# Patient Record
Sex: FEMALE | Race: WHITE | NOT HISPANIC OR LATINO | Employment: UNEMPLOYED | RURAL
[De-identification: names, ages, dates, MRNs, and addresses within clinical notes are randomized per-mention and may not be internally consistent; named-entity substitution may affect disease eponyms.]

---

## 2016-01-19 LAB — PAP RECOMMENDATION EXT: NORMAL

## 2017-03-17 LAB — CRC RECOMMENDATION EXT: NORMAL

## 2022-01-26 ENCOUNTER — OFFICE VISIT (OUTPATIENT)
Dept: FAMILY MEDICINE | Facility: CLINIC | Age: 37
End: 2022-01-26
Payer: OTHER GOVERNMENT

## 2022-01-26 VITALS
OXYGEN SATURATION: 98 % | WEIGHT: 131 LBS | RESPIRATION RATE: 16 BRPM | BODY MASS INDEX: 18.75 KG/M2 | SYSTOLIC BLOOD PRESSURE: 131 MMHG | HEIGHT: 70 IN | TEMPERATURE: 99 F | HEART RATE: 72 BPM | DIASTOLIC BLOOD PRESSURE: 87 MMHG

## 2022-01-26 DIAGNOSIS — B34.9 VIRAL ILLNESS: ICD-10-CM

## 2022-01-26 DIAGNOSIS — Z11.52 ENCOUNTER FOR SCREENING FOR COVID-19: Primary | ICD-10-CM

## 2022-01-26 LAB
CTP QC/QA: YES
FLUAV AG NPH QL: NEGATIVE
FLUBV AG NPH QL: NEGATIVE
SARS-COV-2 AG RESP QL IA.RAPID: NEGATIVE

## 2022-01-26 PROCEDURE — 87428 SARSCOV & INF VIR A&B AG IA: CPT | Mod: QW,,, | Performed by: FAMILY MEDICINE

## 2022-01-26 PROCEDURE — 87428 POCT SARS-COV2 (COVID) WITH FLU ANTIGEN: ICD-10-PCS | Mod: QW,,, | Performed by: FAMILY MEDICINE

## 2022-01-26 PROCEDURE — 99203 PR OFFICE/OUTPT VISIT, NEW, LEVL III, 30-44 MIN: ICD-10-PCS | Mod: ,,, | Performed by: FAMILY MEDICINE

## 2022-01-26 PROCEDURE — 99203 OFFICE O/P NEW LOW 30 MIN: CPT | Mod: ,,, | Performed by: FAMILY MEDICINE

## 2022-01-26 RX ORDER — PROMETHAZINE HYDROCHLORIDE 25 MG/1
TABLET ORAL
COMMUNITY
Start: 2021-11-22

## 2022-01-26 RX ORDER — RIZATRIPTAN BENZOATE 10 MG/1
TABLET, ORALLY DISINTEGRATING ORAL
COMMUNITY
Start: 2021-11-23 | End: 2023-05-02

## 2022-01-26 RX ORDER — NARATRIPTAN 2.5 MG/1
2.5 TABLET ORAL
COMMUNITY
Start: 2021-11-22

## 2022-01-26 RX ORDER — CHOLECALCIFEROL (VITAMIN D3) 125 MCG
CAPSULE ORAL DAILY
COMMUNITY

## 2022-01-26 RX ORDER — TOPIRAMATE 50 MG/1
TABLET, FILM COATED ORAL
COMMUNITY
Start: 2021-11-22 | End: 2024-01-04 | Stop reason: SDUPTHER

## 2022-01-26 RX ORDER — BUPROPION HYDROCHLORIDE 300 MG/1
300 TABLET ORAL DAILY
COMMUNITY
Start: 2021-09-22 | End: 2024-01-04 | Stop reason: SDUPTHER

## 2022-01-26 RX ORDER — NICOTINE POLACRILEX 2 MG
1 GUM BUCCAL DAILY
COMMUNITY

## 2022-01-26 NOTE — PROGRESS NOTES
Subjective:       Patient ID: Kimberly Knutson is a 36 y.o. female.    Chief Complaint: Nasal Congestion, Otalgia, and Sore Throat (X 1 day)    Some right ear discomfort.  No fever some fatigue mild hacky cough    Review of Systems      Objective:      Physical Exam  Constitutional:       Appearance: She is ill-appearing (Mildly). She is not toxic-appearing.   HENT:      Right Ear: Tympanic membrane normal.      Left Ear: Tympanic membrane normal.      Nose: Congestion and rhinorrhea present.   Cardiovascular:      Rate and Rhythm: Normal rate and regular rhythm.   Pulmonary:      Effort: Pulmonary effort is normal.      Breath sounds: Normal breath sounds.   Neurological:      Mental Status: She is alert.         Assessment:       Problem List Items Addressed This Visit    None     Visit Diagnoses     Encounter for screening for COVID-19    -  Primary    Relevant Orders    POCT SARS-COV2 (COVID) with Flu Antigen (Completed)    Viral illness              Plan:         patient likely has COVID despite a negative test.  Recommend quarantine for at least 5 days

## 2023-02-22 LAB — PAP RECOMMENDATION EXT: NORMAL

## 2023-05-02 ENCOUNTER — OFFICE VISIT (OUTPATIENT)
Dept: FAMILY MEDICINE | Facility: CLINIC | Age: 38
End: 2023-05-02
Payer: OTHER GOVERNMENT

## 2023-05-02 ENCOUNTER — PATIENT MESSAGE (OUTPATIENT)
Dept: FAMILY MEDICINE | Facility: CLINIC | Age: 38
End: 2023-05-02
Payer: OTHER GOVERNMENT

## 2023-05-02 VITALS
HEIGHT: 70 IN | BODY MASS INDEX: 20.62 KG/M2 | TEMPERATURE: 98 F | HEART RATE: 69 BPM | SYSTOLIC BLOOD PRESSURE: 122 MMHG | DIASTOLIC BLOOD PRESSURE: 85 MMHG | WEIGHT: 144 LBS | RESPIRATION RATE: 20 BRPM | OXYGEN SATURATION: 100 %

## 2023-05-02 DIAGNOSIS — H92.09 OTALGIA, UNSPECIFIED LATERALITY: Primary | ICD-10-CM

## 2023-05-02 DIAGNOSIS — Z76.89 ENCOUNTER TO ESTABLISH CARE: ICD-10-CM

## 2023-05-02 PROBLEM — G43.709 CHRONIC MIGRAINE WITHOUT AURA WITHOUT STATUS MIGRAINOSUS, NOT INTRACTABLE: Status: ACTIVE | Noted: 2019-10-03

## 2023-05-02 PROCEDURE — 99212 OFFICE O/P EST SF 10 MIN: CPT | Mod: ,,, | Performed by: NURSE PRACTITIONER

## 2023-05-02 PROCEDURE — 99212 PR OFFICE/OUTPT VISIT, EST, LEVL II, 10-19 MIN: ICD-10-PCS | Mod: ,,, | Performed by: NURSE PRACTITIONER

## 2023-05-02 RX ORDER — VITAMIN E MIXED 400 UNIT
400 CAPSULE ORAL DAILY
COMMUNITY

## 2023-05-02 NOTE — PROGRESS NOTES
Deirdre Long, LIZBET   [facility]      PATIENT NAME: Kimberly Knutson  : 1985  DATE: 23  MRN: [id number]      Patient PCP Information       Provider PCP Type    Primary Doctor No General            Reason for Visit / Chief Complaint: Establish Care (Patient presents to clinic to establish primary care.) and Otalgia (Both ear pain but mainly right.)         History of Present Illness / Problem Focused Workflow     Kimberly Knutson presents to the clinic with Establish Care (Patient presents to clinic to establish primary care.) and Otalgia (Both ear pain but mainly right.)     HPI  Ms Knutson presents to clinic to establish care. Also reports right ear pain. Radiating down side of face.   Denies fever. Denies hx of TMJ.  No URI symptoms. No increased sinus congestion.     Prior medical hx and current medications reviewed.   Prior hx of anxiety and migraines.   [facility]- prior hx of abnormal pap smear and colposcopy in past / records requested    Review of Systems     Review of Systems   Constitutional:  Negative for activity change, appetite change, chills, diaphoresis, fatigue, fever and unexpected weight change.   HENT:  Positive for ear pain. Negative for congestion, facial swelling, hearing loss, nosebleeds and sore throat.    Eyes: Negative.    Respiratory:  Negative for apnea, cough, shortness of breath and wheezing.    Cardiovascular:  Negative for chest pain, palpitations and leg swelling.   Gastrointestinal:  Negative for abdominal distention, abdominal pain, blood in stool, constipation, diarrhea and nausea.   Endocrine: Negative for cold intolerance, heat intolerance, polydipsia, polyphagia and polyuria.   Genitourinary:  Negative for decreased urine volume, difficulty urinating, dysuria, flank pain, frequency, hematuria and urgency.   Musculoskeletal:  Negative for arthralgias, joint swelling and myalgias.   Skin:  Negative for color change and rash.   Allergic/Immunologic: Negative.     Neurological:  Negative for dizziness, tremors, seizures, syncope, facial asymmetry, speech difficulty, weakness, light-headedness, numbness and headaches.   Hematological:  Negative for adenopathy. Does not bruise/bleed easily.   Psychiatric/Behavioral:  Negative for behavioral problems and confusion.      Medical / Social / Family History     Past Medical History:   Diagnosis Date    Generalized anxiety disorder        Past Surgical History:   Procedure Laterality Date    barium enema      CERVICAL BIOPSY  W/ LOOP ELECTRODE EXCISION       SECTION      2 c sections    COLONOSCOPY      COLPOSCOPY      GALLBLADDER SURGERY      hemorroidectomy      mommy makeover      Tummy landon davis lipo, breast augmentation with lift.    pylonidal cyst      REPAIR OF EPIGASTRIC HERNIA      TONSILLECTOMY      wisdom teeth removal      All wisdom teeth       Social History  Ms.  reports that she has never smoked. She has never been exposed to tobacco smoke. She has never used smokeless tobacco. She reports that she does not currently use alcohol after a past usage of about 5.0 standard drinks per week. She reports that she does not use drugs.    Family History  Ms.'s family history includes Arthritis in her maternal grandmother and mother; Diabetes in her paternal grandmother; Heart disease in her paternal grandfather; Hypertension in her maternal grandmother; Lung cancer in her maternal grandmother; No Known Problems in her father.    Medications and Allergies     Medications  Outpatient Medications Marked as Taking for the 23 encounter (Office Visit) with LIZBET Rivera   Medication Sig Dispense Refill    biotin 1 mg Cap biotin   76588 daily      buPROPion (WELLBUTRIN XL) 300 MG 24 hr tablet once daily.      cholecalciferol, vitamin D3, 125 mcg (5,000 unit) capsule Take by mouth once daily.      psyllium husk, with sugar, 3.4 gram/7 gram Powd Take 1 packet by mouth. metamucil      topiramate (TOPAMAX) 50  "MG tablet TAKE 1 TABLET BY MOUTH TWICE DAILY FOR HEADACHE PREVENTION         Allergies  Review of patient's allergies indicates:   Allergen Reactions    Xylocaine with epinephrine [lidocaine-epinephrine] Swelling     Swelling of tongue       Physical Examination     Vitals:    05/02/23 1505   BP: 122/85   BP Location: Right arm   Patient Position: Sitting   BP Method: Medium (Automatic)   Pulse: 69   Resp: 20   Temp: 97.7 °F (36.5 °C)   TempSrc: Oral   SpO2: 100%   Weight: 65.3 kg (144 lb)   Height: 5' 10" (1.778 m)       Physical Exam  Vitals and nursing note reviewed.   Constitutional:       Appearance: Normal appearance. She is normal weight.   HENT:      Head: Normocephalic.      Right Ear: Tympanic membrane, ear canal and external ear normal.      Left Ear: Tympanic membrane, ear canal and external ear normal.      Ears:      Comments: No TMJ crepitus on exam     Nose: Nose normal.      Mouth/Throat:      Mouth: Mucous membranes are moist.   Eyes:      Extraocular Movements: Extraocular movements intact.      Conjunctiva/sclera: Conjunctivae normal.      Pupils: Pupils are equal, round, and reactive to light.   Cardiovascular:      Rate and Rhythm: Normal rate and regular rhythm.      Pulses: Normal pulses.      Heart sounds: Normal heart sounds. No murmur heard.  Pulmonary:      Effort: Pulmonary effort is normal.      Breath sounds: Normal breath sounds. No stridor. No wheezing or rhonchi.   Abdominal:      General: Bowel sounds are normal. There is no distension.      Palpations: Abdomen is soft. There is no mass.      Tenderness: There is no abdominal tenderness.   Musculoskeletal:         General: No swelling or tenderness. Normal range of motion.      Cervical back: Normal range of motion and neck supple.      Right lower leg: No edema.      Left lower leg: No edema.   Skin:     General: Skin is warm and dry.      Capillary Refill: Capillary refill takes less than 2 seconds.   Neurological:      General: " No focal deficit present.      Mental Status: She is alert and oriented to person, place, and time. Mental status is at baseline.      Cranial Nerves: No cranial nerve deficit.      Sensory: No sensory deficit.      Motor: No weakness.   Psychiatric:         Mood and Affect: Mood normal.         Behavior: Behavior normal.         Thought Content: Thought content normal.         Judgment: Judgment normal.         No visits with results within 14 Day(s) from this visit.   Latest known visit with results is:   Office Visit on 01/26/2022   Component Date Value Ref Range Status    SARS Coronavirus 2 Antigen 01/26/2022 Negative  Negative Final    Rapid Influenza A Ag 01/26/2022 Negative  Negative Final    Rapid Influenza B Ag 01/26/2022 Negative  Negative Final     Acceptable 01/26/2022 Yes   Final             Assessment and Plan (including Health Maintenance)         Plan:   Otalgia, unspecified laterality    Encounter to establish care     OTC NSAID prn for ear pain, RTC if pain persist  RTC for routine wellness   There are no Patient Instructions on file for this visit.       Health Maintenance Due   Topic Date Due    Hepatitis C Screening  Never done    Cervical Cancer Screening  Never done    Lipid Panel  Never done    HIV Screening  Never done    COVID-19 Vaccine (3 - Booster for Pfizer series) 03/04/2021       Most Recent Immunizations   Administered Date(s) Administered    COVID-19, MRNA, LN-S, PF (Pfizer) (Purple Cap) 01/07/2021    Hepatitis A, Adult 08/14/2020    Influenza 09/18/2020    Tdap 09/23/2014        Problem List Items Addressed This Visit    None  Visit Diagnoses       Otalgia, unspecified laterality    -  Primary    Encounter to establish care                Health Maintenance Topics with due status: Not Due       Topic Last Completion Date    TETANUS VACCINE 09/23/2014    Influenza Vaccine 09/18/2020       Future Appointments   Date Time Provider Department Center   6/8/2023  8:30 AM  LIZBET Rivera [facility] [place]            Signature:  LIZBET Rivera  [facility]     Date of encounter: 5/2/23

## 2023-05-03 ENCOUNTER — PATIENT OUTREACH (OUTPATIENT)
Dept: ADMINISTRATIVE | Facility: HOSPITAL | Age: 38
End: 2023-05-03

## 2023-05-03 NOTE — LETTER
AUTHORIZATION FOR RELEASE OF   CONFIDENTIAL INFORMATION    Dear [facility],    We are seeing Kimberly Knutson, date of birth 1985, in the clinic at [facility]. LIZBET Rivera is the patient's PCP. Kimberly Knutson has an outstanding lab/procedure at the time we reviewed her chart. In order to help keep her health information updated, she has authorized us to request the following medical record(s):        (  )  MAMMOGRAM                                      ( x )  COLONOSCOPY      (  )  PAP SMEAR                                          (  )  OUTSIDE LAB RESULTS     (  )  DEXA SCAN                                          (  )  EYE EXAM            (  )  FOOT EXAM                                          (  )  ENTIRE RECORD     (  )  OUTSIDE IMMUNIZATIONS                 (  )  _______________         Please fax records to [facility], Melissa P Rogers, FNP, [phone]     If you have any questions, please contact Blake Mendez at .           Patient Name: Kimberly Knutson  : 1985  Patient Phone #: [phone]

## 2023-05-04 ENCOUNTER — PATIENT OUTREACH (OUTPATIENT)
Dept: ADMINISTRATIVE | Facility: HOSPITAL | Age: 38
End: 2023-05-04

## 2023-05-04 NOTE — Clinical Note
Just wanted you to be aware that there were no records found for this patient at [facility].

## 2023-05-08 ENCOUNTER — PATIENT OUTREACH (OUTPATIENT)
Dept: ADMINISTRATIVE | Facility: HOSPITAL | Age: 38
End: 2023-05-08

## 2023-05-10 ENCOUNTER — PATIENT MESSAGE (OUTPATIENT)
Dept: FAMILY MEDICINE | Facility: CLINIC | Age: 38
End: 2023-05-10
Payer: OTHER GOVERNMENT

## 2023-05-25 ENCOUNTER — PATIENT MESSAGE (OUTPATIENT)
Dept: FAMILY MEDICINE | Facility: CLINIC | Age: 38
End: 2023-05-25
Payer: OTHER GOVERNMENT

## 2023-05-26 ENCOUNTER — OFFICE VISIT (OUTPATIENT)
Dept: FAMILY MEDICINE | Facility: CLINIC | Age: 38
End: 2023-05-26
Payer: OTHER GOVERNMENT

## 2023-05-26 VITALS
HEIGHT: 70 IN | OXYGEN SATURATION: 99 % | HEART RATE: 73 BPM | BODY MASS INDEX: 20.33 KG/M2 | WEIGHT: 142 LBS | RESPIRATION RATE: 20 BRPM | SYSTOLIC BLOOD PRESSURE: 118 MMHG | DIASTOLIC BLOOD PRESSURE: 80 MMHG | TEMPERATURE: 98 F

## 2023-05-26 DIAGNOSIS — N30.01 ACUTE CYSTITIS WITH HEMATURIA: ICD-10-CM

## 2023-05-26 DIAGNOSIS — R39.9 UTI SYMPTOMS: Primary | ICD-10-CM

## 2023-05-26 LAB
BILIRUB SERPL-MCNC: NEGATIVE MG/DL
BLOOD URINE, POC: ABNORMAL
COLOR, POC UA: YELLOW
GLUCOSE UR QL STRIP: NEGATIVE
KETONES UR QL STRIP: NEGATIVE
LEUKOCYTE ESTERASE URINE, POC: ABNORMAL
NITRITE, POC UA: NEGATIVE
PH, POC UA: 5.5
PROTEIN, POC: NEGATIVE
SPECIFIC GRAVITY, POC UA: 1.01
UROBILINOGEN, POC UA: 0.2

## 2023-05-26 PROCEDURE — 87086 URINE CULTURE/COLONY COUNT: CPT | Mod: ,,, | Performed by: CLINICAL MEDICAL LABORATORY

## 2023-05-26 PROCEDURE — 87186 SC STD MICRODIL/AGAR DIL: CPT | Mod: ,,, | Performed by: CLINICAL MEDICAL LABORATORY

## 2023-05-26 PROCEDURE — 99213 PR OFFICE/OUTPT VISIT, EST, LEVL III, 20-29 MIN: ICD-10-PCS | Mod: ,,, | Performed by: NURSE PRACTITIONER

## 2023-05-26 PROCEDURE — 87077 CULTURE, URINE: ICD-10-PCS | Mod: ,,, | Performed by: CLINICAL MEDICAL LABORATORY

## 2023-05-26 PROCEDURE — 87086 CULTURE, URINE: ICD-10-PCS | Mod: ,,, | Performed by: CLINICAL MEDICAL LABORATORY

## 2023-05-26 PROCEDURE — 99213 OFFICE O/P EST LOW 20 MIN: CPT | Mod: ,,, | Performed by: NURSE PRACTITIONER

## 2023-05-26 PROCEDURE — 81003 URINALYSIS AUTO W/O SCOPE: CPT | Mod: QW,,, | Performed by: NURSE PRACTITIONER

## 2023-05-26 PROCEDURE — 87077 CULTURE AEROBIC IDENTIFY: CPT | Mod: ,,, | Performed by: CLINICAL MEDICAL LABORATORY

## 2023-05-26 PROCEDURE — 87186 CULTURE, URINE: ICD-10-PCS | Mod: ,,, | Performed by: CLINICAL MEDICAL LABORATORY

## 2023-05-26 PROCEDURE — 81003 POCT URINALYSIS W/O SCOPE: ICD-10-PCS | Mod: QW,,, | Performed by: NURSE PRACTITIONER

## 2023-05-26 RX ORDER — NITROFURANTOIN 25; 75 MG/1; MG/1
100 CAPSULE ORAL 2 TIMES DAILY
Qty: 20 CAPSULE | Refills: 0 | Status: SHIPPED | OUTPATIENT
Start: 2023-05-26 | End: 2023-06-05

## 2023-05-26 NOTE — PROGRESS NOTES
LIZBET Rivera   [facility]      PATIENT NAME: Kimberly Knutson  : 1985  DATE: 23  MRN: [id number]      Patient PCP Information       Provider PCP Type    Deirdre P LIZBET Long General            Reason for Visit / Chief Complaint: Urinary Tract Infection (Patient presents to the clinic complaints of uti/Rm 3)           History of Present Illness / Problem Focused Workflow     Kimberly Knutson presents to the clinic with Urinary Tract Infection (Patient presents to the clinic complaints of uti/Rm 3)     HPI  Ms Knutson presents to clinic with concern for UTI. Lower abd pain, dysuria and frequency.   Denies nvd.     Review of Systems     Review of Systems   Constitutional:  Negative for activity change, appetite change, chills, diaphoresis, fatigue, fever and unexpected weight change.   HENT:  Negative for congestion, ear pain, facial swelling, hearing loss, nosebleeds and sore throat.    Respiratory:  Negative for apnea, cough, shortness of breath and wheezing.    Cardiovascular:  Negative for chest pain, palpitations and leg swelling.   Gastrointestinal:  Positive for abdominal distention. Negative for abdominal pain, blood in stool, constipation, diarrhea and nausea.   Endocrine: Negative for cold intolerance, heat intolerance, polydipsia, polyphagia and polyuria.   Genitourinary:  Positive for dysuria, frequency and hematuria. Negative for decreased urine volume, difficulty urinating, flank pain and urgency.   Musculoskeletal:  Negative for arthralgias, joint swelling and myalgias.   Skin:  Negative for color change and rash.   Neurological:  Negative for dizziness, tremors, seizures, syncope, facial asymmetry, speech difficulty, weakness, light-headedness, numbness and headaches.   Hematological:  Negative for adenopathy. Does not bruise/bleed easily.   Psychiatric/Behavioral:  Negative for behavioral problems and confusion.      Medical / Social / Family History     Past Medical History:    Diagnosis Date    Generalized anxiety disorder        Past Surgical History:   Procedure Laterality Date    barium enema      CERVICAL BIOPSY  W/ LOOP ELECTRODE EXCISION       SECTION      2 c sections    COLONOSCOPY      COLPOSCOPY      GALLBLADDER SURGERY      hemorroidectomy      mommy makeover      Tummy tuck, lovehandle lipo, breast augmentation with lift.    pylonidal cyst      REPAIR OF EPIGASTRIC HERNIA      TONSILLECTOMY      wisdom teeth removal      All wisdom teeth       Social History  Ms.  reports that she has never smoked. She has never been exposed to tobacco smoke. She has never used smokeless tobacco. She reports that she does not currently use alcohol after a past usage of about 5.0 standard drinks per week. She reports that she does not use drugs.    Family History  Ms.'s family history includes Arthritis in her maternal grandmother and mother; Diabetes in her paternal grandmother; Heart disease in her paternal grandfather; Hypertension in her maternal grandmother; Lung cancer in her maternal grandmother; No Known Problems in her father.    Medications and Allergies     Medications  Outpatient Medications Marked as Taking for the 23 encounter (Office Visit) with LIZBET Rivera   Medication Sig Dispense Refill    biotin 1 mg Cap biotin   60686 daily      buPROPion (WELLBUTRIN XL) 300 MG 24 hr tablet once daily.      cholecalciferol, vitamin D3, 125 mcg (5,000 unit) capsule Take by mouth once daily.      naratriptan (AMERGE) 2.5 MG tablet as needed.      promethazine (PHENERGAN) 25 MG tablet TAKE 1/2 TO 1 TABLET BY MOUTH EVERY 6 HOURS AS NEEDED FOR NAUSEA OR HEADACHE      psyllium husk, with sugar, 3.4 gram/7 gram Powd Take 1 packet by mouth. metamucil      topiramate (TOPAMAX) 50 MG tablet TAKE 1 TABLET BY MOUTH TWICE DAILY FOR HEADACHE PREVENTION      vitamin E, dl,tocopheryl acet, (VITAMIN E, DL, ACETATE,) 180 mg (400 unit) Cap          Allergies  Review of patient's  "allergies indicates:   Allergen Reactions    Xylocaine with epinephrine [lidocaine-epinephrine] Swelling     Swelling of tongue       Physical Examination     Vitals:    05/26/23 0957   BP: 118/80   BP Location: Right arm   Patient Position: Sitting   BP Method: Small (Automatic)   Pulse: 73   Resp: 20   Temp: 97.7 °F (36.5 °C)   TempSrc: Oral   SpO2: 99%   Weight: 64.4 kg (142 lb)   Height: 5' 10" (1.778 m)     Over the last two weeks how often have you been bothered by little interest or pleasure in doing things: 0  Over the last two weeks how often have you been bothered by feeling down, depressed or hopeless: 0  PHQ-2 Total Score: 0  PHQ-9 Score: 2  PHQ-9 Interpretation: Minimal or None      Physical Exam  Vitals and nursing note reviewed.   Constitutional:       Appearance: Normal appearance. She is normal weight.   HENT:      Head: Normocephalic.      Right Ear: External ear normal.      Left Ear: External ear normal.      Nose: Nose normal.      Mouth/Throat:      Mouth: Mucous membranes are moist.   Eyes:      Extraocular Movements: Extraocular movements intact.      Conjunctiva/sclera: Conjunctivae normal.      Pupils: Pupils are equal, round, and reactive to light.   Cardiovascular:      Rate and Rhythm: Normal rate and regular rhythm.      Pulses: Normal pulses.      Heart sounds: Normal heart sounds. No murmur heard.  Pulmonary:      Effort: Pulmonary effort is normal.      Breath sounds: Normal breath sounds. No stridor. No wheezing or rhonchi.   Abdominal:      General: Bowel sounds are normal. There is no distension.      Palpations: Abdomen is soft. There is no mass.      Tenderness: There is abdominal tenderness.      Comments: generalized   Musculoskeletal:         General: No swelling or tenderness. Normal range of motion.      Cervical back: Normal range of motion and neck supple.      Right lower leg: No edema.      Left lower leg: No edema.   Skin:     General: Skin is warm and dry.      " Capillary Refill: Capillary refill takes less than 2 seconds.   Neurological:      General: No focal deficit present.      Mental Status: She is alert and oriented to person, place, and time. Mental status is at baseline.      Cranial Nerves: No cranial nerve deficit.      Sensory: No sensory deficit.      Motor: No weakness.   Psychiatric:         Mood and Affect: Mood normal.         Behavior: Behavior normal.         Thought Content: Thought content normal.         Judgment: Judgment normal.         Office Visit on 05/26/2023   Component Date Value Ref Range Status    Color, UA 05/26/2023 Yellow   Final    Spec Grav UA 05/26/2023 1.010   Final    pH, UA 05/26/2023 5.5   Final    WBC, UA 05/26/2023 small   Final    Nitrite, UA 05/26/2023 negative   Final    Protein, POC 05/26/2023 negative   Final    Glucose, UA 05/26/2023 negative   Final    Ketones, UA 05/26/2023 negative   Final    Bilirubin, POC 05/26/2023 negative   Final    Urobilinogen, UA 05/26/2023 0.2   Final    Blood, UA 05/26/2023 moderate   Final             Assessment and Plan (including Health Maintenance)       Plan:   UTI symptoms  -     POCT URINALYSIS W/O SCOPE  -     Urine culture; Future; Expected date: 05/26/2023    Acute cystitis with hematuria  -     nitrofurantoin, macrocrystal-monohydrate, (MACROBID) 100 MG capsule; Take 1 capsule (100 mg total) by mouth 2 (two) times daily. for 10 days  Dispense: 20 capsule; Refill: 0    Increase oral hydration RTC prn   There are no Patient Instructions on file for this visit.       Health Maintenance Due   Topic Date Due    Hepatitis C Screening  Never done    Lipid Panel  Never done    HIV Screening  Never done    COVID-19 Vaccine (3 - Pfizer series) 03/04/2021       Most Recent Immunizations   Administered Date(s) Administered    COVID-19, MRNA, LN-S, PF (Pfizer) (Purple Cap) 01/07/2021    Hepatitis A, Adult 08/14/2020    Influenza 09/18/2020    Tdap 09/23/2014        Problem List Items Addressed  This Visit    None  Visit Diagnoses       UTI symptoms    -  Primary    Relevant Orders    POCT URINALYSIS W/O SCOPE (Completed)    Urine culture    Acute cystitis with hematuria        Relevant Medications    nitrofurantoin, macrocrystal-monohydrate, (MACROBID) 100 MG capsule            Health Maintenance Topics with due status: Not Due       Topic Last Completion Date    TETANUS VACCINE 09/23/2014    Influenza Vaccine 09/18/2020    Cervical Cancer Screening 02/22/2023       Future Appointments   Date Time Provider Department Center   6/8/2023  8:30 AM LIZBET Rivera [facility] [place]            Signature:  LIZBET Rivera  [facility]     Date of encounter: 5/26/23

## 2023-05-26 NOTE — Clinical Note
Can we request records again under her maiden name Kimberly Goldberg (her maiden name) sorry about that. She mentioned today she wasn't  at the time of study. Thanks Deirdre

## 2023-05-28 LAB — UA COMPLETE W REFLEX CULTURE PNL UR: ABNORMAL

## 2023-05-31 ENCOUNTER — PATIENT MESSAGE (OUTPATIENT)
Dept: FAMILY MEDICINE | Facility: CLINIC | Age: 38
End: 2023-05-31
Payer: OTHER GOVERNMENT

## 2023-06-05 ENCOUNTER — PATIENT OUTREACH (OUTPATIENT)
Dept: ADMINISTRATIVE | Facility: HOSPITAL | Age: 38
End: 2023-06-05

## 2023-06-05 NOTE — PROGRESS NOTES
CHRISTIAN faxed to [facility] again in [place] but requested records under maiden name Kimberly Goldberg. [phone] (G).

## 2023-06-05 NOTE — LETTER
AUTHORIZATION FOR RELEASE OF   CONFIDENTIAL INFORMATION    Dear [facility],    We are seeing Kimberly Knutson also known as Kimberly Goldberg, date of birth 1985, in the clinic at [facility]. LIZBET Rivera is the patient's PCP. Kimberly Knutson has an outstanding lab/procedure at the time we reviewed her chart. In order to help keep her health information updated, she has authorized us to request the following medical record(s):        (  )  MAMMOGRAM                                      ( x )  COLONOSCOPY      (  )  PAP SMEAR                                          (  )  OUTSIDE LAB RESULTS     (  )  DEXA SCAN                                          (  )  EYE EXAM            (  )  FOOT EXAM                                          (  )  ENTIRE RECORD     (  )  OUTSIDE IMMUNIZATIONS                 (  )  _______________         Please fax records to [facility], Melissa P Rogers, FNP, [phone].     If you have any questions, please contact Blake Mendez at .           Patient Name: Kimberly Knutson  : 1985  Patient Phone #: [phone]

## 2023-06-06 ENCOUNTER — PATIENT OUTREACH (OUTPATIENT)
Dept: ADMINISTRATIVE | Facility: HOSPITAL | Age: 38
End: 2023-06-06

## 2023-06-13 ENCOUNTER — OFFICE VISIT (OUTPATIENT)
Dept: FAMILY MEDICINE | Facility: CLINIC | Age: 38
End: 2023-06-13
Payer: OTHER GOVERNMENT

## 2023-06-13 VITALS
BODY MASS INDEX: 20.45 KG/M2 | OXYGEN SATURATION: 100 % | DIASTOLIC BLOOD PRESSURE: 81 MMHG | WEIGHT: 142.81 LBS | HEIGHT: 70 IN | RESPIRATION RATE: 20 BRPM | TEMPERATURE: 99 F | HEART RATE: 64 BPM | SYSTOLIC BLOOD PRESSURE: 120 MMHG

## 2023-06-13 DIAGNOSIS — Z13.220 SCREENING FOR LIPID DISORDERS: ICD-10-CM

## 2023-06-13 DIAGNOSIS — Z00.00 ENCOUNTER FOR GENERAL ADULT MEDICAL EXAMINATION WITHOUT ABNORMAL FINDINGS: Primary | ICD-10-CM

## 2023-06-13 DIAGNOSIS — Z11.4 SCREENING FOR HIV (HUMAN IMMUNODEFICIENCY VIRUS): ICD-10-CM

## 2023-06-13 DIAGNOSIS — Z13.1 SCREENING FOR DIABETES MELLITUS: ICD-10-CM

## 2023-06-13 DIAGNOSIS — Z11.59 NEED FOR HEPATITIS C SCREENING TEST: ICD-10-CM

## 2023-06-13 LAB
ALBUMIN SERPL BCP-MCNC: 4.3 G/DL (ref 3.5–5)
ALBUMIN/GLOB SERPL: 1.5 {RATIO}
ALP SERPL-CCNC: 47 U/L (ref 37–98)
ALT SERPL W P-5'-P-CCNC: 19 U/L (ref 13–56)
ANION GAP SERPL CALCULATED.3IONS-SCNC: 9 MMOL/L (ref 7–16)
AST SERPL W P-5'-P-CCNC: 12 U/L (ref 15–37)
BASOPHILS # BLD AUTO: 0.04 K/UL (ref 0–0.2)
BASOPHILS NFR BLD AUTO: 0.6 % (ref 0–1)
BILIRUB SERPL-MCNC: 0.7 MG/DL (ref ?–1.2)
BUN SERPL-MCNC: 15 MG/DL (ref 7–18)
BUN/CREAT SERPL: 16 (ref 6–20)
CALCIUM SERPL-MCNC: 9 MG/DL (ref 8.5–10.1)
CHLORIDE SERPL-SCNC: 109 MMOL/L (ref 98–107)
CHOLEST SERPL-MCNC: 198 MG/DL (ref 0–200)
CHOLEST/HDLC SERPL: 2.9 {RATIO}
CO2 SERPL-SCNC: 24 MMOL/L (ref 21–32)
CREAT SERPL-MCNC: 0.96 MG/DL (ref 0.55–1.02)
DIFFERENTIAL METHOD BLD: ABNORMAL
EGFR (NO RACE VARIABLE) (RUSH/TITUS): 77 ML/MIN/1.73M2
EOSINOPHIL # BLD AUTO: 0.05 K/UL (ref 0–0.5)
EOSINOPHIL NFR BLD AUTO: 0.8 % (ref 1–4)
ERYTHROCYTE [DISTWIDTH] IN BLOOD BY AUTOMATED COUNT: 12.1 % (ref 11.5–14.5)
GLOBULIN SER-MCNC: 2.8 G/DL (ref 2–4)
GLUCOSE SERPL-MCNC: 88 MG/DL (ref 74–106)
HCT VFR BLD AUTO: 42.7 % (ref 38–47)
HCV AB SER QL: NORMAL
HDLC SERPL-MCNC: 68 MG/DL (ref 40–60)
HGB BLD-MCNC: 14.3 G/DL (ref 12–16)
HIV 1+O+2 AB SERPL QL: NORMAL
IMM GRANULOCYTES # BLD AUTO: 0.02 K/UL (ref 0–0.04)
IMM GRANULOCYTES NFR BLD: 0.3 % (ref 0–0.4)
LDLC SERPL CALC-MCNC: 114 MG/DL
LDLC/HDLC SERPL: 1.7 {RATIO}
LYMPHOCYTES # BLD AUTO: 1.99 K/UL (ref 1–4.8)
LYMPHOCYTES NFR BLD AUTO: 32.3 % (ref 27–41)
MCH RBC QN AUTO: 29.3 PG (ref 27–31)
MCHC RBC AUTO-ENTMCNC: 33.5 G/DL (ref 32–36)
MCV RBC AUTO: 87.5 FL (ref 80–96)
MONOCYTES # BLD AUTO: 0.43 K/UL (ref 0–0.8)
MONOCYTES NFR BLD AUTO: 7 % (ref 2–6)
MPC BLD CALC-MCNC: 9.7 FL (ref 9.4–12.4)
NEUTROPHILS # BLD AUTO: 3.64 K/UL (ref 1.8–7.7)
NEUTROPHILS NFR BLD AUTO: 59 % (ref 53–65)
NONHDLC SERPL-MCNC: 130 MG/DL
NRBC # BLD AUTO: 0 X10E3/UL
NRBC, AUTO (.00): 0 %
PLATELET # BLD AUTO: 237 K/UL (ref 150–400)
POTASSIUM SERPL-SCNC: 3.9 MMOL/L (ref 3.5–5.1)
PROT SERPL-MCNC: 7.1 G/DL (ref 6.4–8.2)
RBC # BLD AUTO: 4.88 M/UL (ref 4.2–5.4)
SODIUM SERPL-SCNC: 138 MMOL/L (ref 136–145)
TRIGL SERPL-MCNC: 78 MG/DL (ref 35–150)
VLDLC SERPL-MCNC: 16 MG/DL
WBC # BLD AUTO: 6.17 K/UL (ref 4.5–11)

## 2023-06-13 PROCEDURE — 85025 COMPLETE CBC W/AUTO DIFF WBC: CPT | Mod: ,,, | Performed by: CLINICAL MEDICAL LABORATORY

## 2023-06-13 PROCEDURE — 99395 PREV VISIT EST AGE 18-39: CPT | Mod: ,,, | Performed by: NURSE PRACTITIONER

## 2023-06-13 PROCEDURE — 87389 HIV-1 AG W/HIV-1&-2 AB AG IA: CPT | Mod: ,,, | Performed by: CLINICAL MEDICAL LABORATORY

## 2023-06-13 PROCEDURE — 80061 LIPID PANEL: ICD-10-PCS | Mod: ,,, | Performed by: CLINICAL MEDICAL LABORATORY

## 2023-06-13 PROCEDURE — 86803 HEPATITIS C AB TEST: CPT | Mod: ,,, | Performed by: CLINICAL MEDICAL LABORATORY

## 2023-06-13 PROCEDURE — 87389 HIV 1 / 2 ANTIBODY: ICD-10-PCS | Mod: ,,, | Performed by: CLINICAL MEDICAL LABORATORY

## 2023-06-13 PROCEDURE — 99395 PR PREVENTIVE VISIT,EST,18-39: ICD-10-PCS | Mod: ,,, | Performed by: NURSE PRACTITIONER

## 2023-06-13 PROCEDURE — 80053 COMPREHENSIVE METABOLIC PANEL: ICD-10-PCS | Mod: ,,, | Performed by: CLINICAL MEDICAL LABORATORY

## 2023-06-13 PROCEDURE — 86803 HEPATITIS C ANTIBODY: ICD-10-PCS | Mod: ,,, | Performed by: CLINICAL MEDICAL LABORATORY

## 2023-06-13 PROCEDURE — 85025 CBC WITH DIFFERENTIAL: ICD-10-PCS | Mod: ,,, | Performed by: CLINICAL MEDICAL LABORATORY

## 2023-06-13 PROCEDURE — 80061 LIPID PANEL: CPT | Mod: ,,, | Performed by: CLINICAL MEDICAL LABORATORY

## 2023-06-13 PROCEDURE — 80053 COMPREHEN METABOLIC PANEL: CPT | Mod: ,,, | Performed by: CLINICAL MEDICAL LABORATORY

## 2023-06-13 NOTE — PROGRESS NOTES
" LIZBET Rivera   [facility]      PATIENT NAME: Kimberly Knutson  : 1985  DATE: 23  MRN: [id number]      Patient PCP Information       Provider PCP Type    LIZBET Rivera General          Chief Complaint      Chief Complaint   Patient presents with    wellness     Patient presents to the clinic a wellness ''"  Rm1         History of Present Illness        Kimberly Knutson is a 37 y.o. female who presents for routine wellness visit. Pt states she is doing well with no acute complaints. Pt denies FHx of breast or cervical cancer. Paternal aunt hx of colon cancer. Last colonoscopy was  with routine screening 45 year old recs. Torturous colon in past.  Last mammogram was [id number] mastitis. Last pap was 2023 ASCUS follow up scheduled in Aug with Javan. Had flu shot recommended in 2023. Nonsmoker. Working on improving diet and exercise.    Past Medical History:  Past Medical History:   Diagnosis Date    Generalized anxiety disorder        Past Surgical History:   has a past surgical history that includes Tonsillectomy; Gallbladder surgery; Repair of epigastric hernia; pylonidal cyst; Cervical biopsy w/ loop electrode excision; hemorroidectomy; wisdom teeth removal;  section; Colonoscopy; barium enema; Colposcopy; and mommy makeover.    Social History:  Social History     Tobacco Use    Smoking status: Never     Passive exposure: Never    Smokeless tobacco: Never   Substance Use Topics    Alcohol use: Not Currently     Alcohol/week: 5.0 standard drinks     Types: 2 Glasses of wine, 3 Cans of beer per week     Comment: 5-6 drinks total    Drug use: Never       I personally reviewed all past medical, surgical, and social.     Review of Systems   Constitutional:  Negative for activity change, appetite change, chills, diaphoresis, fatigue, fever and unexpected weight change.   HENT:  Negative for congestion, ear pain, facial swelling, hearing loss, nosebleeds and sore " throat.    Eyes: Negative.    Respiratory:  Negative for apnea, cough, shortness of breath and wheezing.    Cardiovascular:  Negative for chest pain, palpitations and leg swelling.   Gastrointestinal:  Negative for abdominal distention, abdominal pain, blood in stool, constipation, diarrhea and nausea.   Endocrine: Negative for cold intolerance, heat intolerance, polydipsia, polyphagia and polyuria.   Genitourinary:  Negative for decreased urine volume, difficulty urinating, dysuria, flank pain, frequency, hematuria and urgency.   Musculoskeletal:  Negative for arthralgias, joint swelling and myalgias.   Skin:  Negative for color change and rash.   Allergic/Immunologic: Negative.    Neurological:  Negative for dizziness, tremors, seizures, syncope, facial asymmetry, speech difficulty, weakness, light-headedness, numbness and headaches.   Hematological:  Negative for adenopathy. Does not bruise/bleed easily.   Psychiatric/Behavioral:  Negative for behavioral problems and confusion.       Medications:  Outpatient Encounter Medications as of 2023   Medication Sig Dispense Refill    biotin 1 mg Cap biotin   29109 daily      buPROPion (WELLBUTRIN XL) 300 MG 24 hr tablet once daily.      cholecalciferol, vitamin D3, 125 mcg (5,000 unit) capsule Take by mouth once daily.      naratriptan (AMERGE) 2.5 MG tablet as needed.      promethazine (PHENERGAN) 25 MG tablet TAKE 1/2 TO 1 TABLET BY MOUTH EVERY 6 HOURS AS NEEDED FOR NAUSEA OR HEADACHE      psyllium husk, with sugar, 3.4 gram/7 gram Powd Take 1 packet by mouth. metamucil      topiramate (TOPAMAX) 50 MG tablet TAKE 1 TABLET BY MOUTH TWICE DAILY FOR HEADACHE PREVENTION      vitamin E, dl,tocopheryl acet, (VITAMIN E, DL, ACETATE,) 180 mg (400 unit) Cap       [] nitrofurantoin, macrocrystal-monohydrate, (MACROBID) 100 MG capsule Take 1 capsule (100 mg total) by mouth 2 (two) times daily. for 10 days 20 capsule 0     No facility-administered encounter medications  "on file as of 6/13/2023.       Allergies:  Review of patient's allergies indicates:   Allergen Reactions    Xylocaine with epinephrine [lidocaine-epinephrine] Swelling     Swelling of tongue       Health Maintenance:  Immunization History   Administered Date(s) Administered    COVID-19, MRNA, LN-S, PF (Pfizer) (Purple Cap) 12/18/2020, 01/07/2021    Hepatitis A, Adult 01/27/2020, 08/14/2020    Influenza 09/12/2012, 09/23/2014, 09/25/2017, 10/04/2019, 09/18/2020    Tdap 09/23/2014      Health Maintenance   Topic Date Due    TETANUS VACCINE  09/23/2024    Hepatitis C Screening  Completed    Lipid Panel  Completed        Physical Exam      Vital Signs  Temp: 98.5 °F (36.9 °C)  Temp Source: Oral  Pulse: 64  Resp: 20  SpO2: 100 %  BP: 120/81  BP Location: Right arm  Patient Position: Sitting  Height and Weight  Height: 5' 10" (177.8 cm)  Weight: 64.8 kg (142 lb 12.8 oz)  BSA (Calculated - sq m): 1.79 sq meters  BMI (Calculated): 20.5  Weight in (lb) to have BMI = 25: 173.9]  Over the last two weeks how often have you been bothered by little interest or pleasure in doing things: 0  Over the last two weeks how often have you been bothered by feeling down, depressed or hopeless: 0  PHQ-2 Total Score: 0  PHQ-9 Score: 2  PHQ-9 Interpretation: Minimal or None      Physical Exam  Vitals and nursing note reviewed.   Constitutional:       Appearance: Normal appearance. She is normal weight.   HENT:      Head: Normocephalic.      Right Ear: External ear normal.      Left Ear: External ear normal.      Nose: Nose normal.      Mouth/Throat:      Mouth: Mucous membranes are moist.   Eyes:      Extraocular Movements: Extraocular movements intact.      Conjunctiva/sclera: Conjunctivae normal.      Pupils: Pupils are equal, round, and reactive to light.   Cardiovascular:      Rate and Rhythm: Normal rate and regular rhythm.      Pulses: Normal pulses.      Heart sounds: Normal heart sounds. No murmur heard.  Pulmonary:      Effort: " Pulmonary effort is normal.      Breath sounds: Normal breath sounds. No stridor. No wheezing or rhonchi.   Abdominal:      General: Bowel sounds are normal. There is no distension.      Palpations: Abdomen is soft. There is no mass.      Tenderness: There is no abdominal tenderness.   Musculoskeletal:         General: No swelling or tenderness. Normal range of motion.      Cervical back: Normal range of motion and neck supple.      Right lower leg: No edema.      Left lower leg: No edema.   Skin:     General: Skin is warm and dry.      Capillary Refill: Capillary refill takes less than 2 seconds.   Neurological:      General: No focal deficit present.      Mental Status: She is alert and oriented to person, place, and time. Mental status is at baseline.      Cranial Nerves: No cranial nerve deficit.      Sensory: No sensory deficit.      Motor: No weakness.   Psychiatric:         Mood and Affect: Mood normal.         Behavior: Behavior normal.         Thought Content: Thought content normal.         Judgment: Judgment normal.            Is patient >66 yo of age?  No flowsheet data found.    Fasting labs pending   Laboratory:  CBC:  Recent Labs   Lab 06/13/23  1143   WBC 6.17   RBC 4.88   Hemoglobin 14.3   Hematocrit 42.7   Platelet Count 237   MCV 87.5   MCH 29.3   MCHC 33.5     CMP:    LIPIDS:  Recent Labs   Lab 06/13/23  1143   HDL Cholesterol 68 H   Cholesterol 198   Triglycerides 78   LDL Calculated 114   Cholesterol/HDL Ratio (Risk Factor) 2.9   Non-     TSH:            Assessment/Plan     Kimberly Knutson is a 37 y.o.female with:     1. Encounter for general adult medical examination without abnormal findings  - HIV 1/2 Ag/Ab (4th Gen); Future  - Hepatitis C Antibody; Future  - CBC Auto Differential; Future  - Comprehensive Metabolic Panel; Future  - Lipid Panel; Future      2. Screening for HIV (human immunodeficiency virus)  - HIV 1/2 Ag/Ab (4th Gen); Future      3. Need for hepatitis C screening test  -  Hepatitis C Antibody; Future      4. Screening for lipid disorders  - Lipid Panel; Future      5. Screening for diabetes mellitus  - Comprehensive Metabolic Panel; Future         Total time spent face-to-face and non-face-to-face coordinating care for this encounter was: >30 min    Chronic conditions status updated as per HPI.  Other than changes above, cont current medications and maintain follow up with specialists.  Return to clinic 1 year next wellness.    Deirdre Long [facility]

## 2023-11-27 ENCOUNTER — OFFICE VISIT (OUTPATIENT)
Dept: FAMILY MEDICINE | Facility: CLINIC | Age: 38
End: 2023-11-27
Payer: OTHER GOVERNMENT

## 2023-11-27 ENCOUNTER — HOSPITAL ENCOUNTER (OUTPATIENT)
Dept: RADIOLOGY | Facility: HOSPITAL | Age: 38
Discharge: HOME | End: 2023-11-27
Attending: NURSE PRACTITIONER
Payer: OTHER GOVERNMENT

## 2023-11-27 VITALS
DIASTOLIC BLOOD PRESSURE: 82 MMHG | TEMPERATURE: 99 F | HEART RATE: 81 BPM | RESPIRATION RATE: 20 BRPM | SYSTOLIC BLOOD PRESSURE: 118 MMHG | HEIGHT: 70 IN | OXYGEN SATURATION: 100 % | BODY MASS INDEX: 21.24 KG/M2 | WEIGHT: 148.38 LBS

## 2023-11-27 DIAGNOSIS — M13.0 POLYARTHRITIS: Primary | ICD-10-CM

## 2023-11-27 DIAGNOSIS — M25.559 HIP PAIN, UNSPECIFIED LATERALITY: ICD-10-CM

## 2023-11-27 DIAGNOSIS — Z23 NEEDS FLU SHOT: ICD-10-CM

## 2023-11-27 LAB
CRP SERPL-MCNC: <0.29 MG/DL (ref 0–0.8)
ERYTHROCYTE [SEDIMENTATION RATE] IN BLOOD BY WESTERGREN METHOD: 6 MM/HR (ref 0–20)
RHEUMATOID FACT SER NEPH-ACNC: NEGATIVE [IU]/ML

## 2023-11-27 PROCEDURE — 73502 X-RAY EXAM HIP UNI 2-3 VIEWS: CPT | Mod: TC,LT

## 2023-11-27 PROCEDURE — 90471 IMMUNIZATION ADMIN: CPT | Mod: ,,, | Performed by: NURSE PRACTITIONER

## 2023-11-27 PROCEDURE — 90686 IIV4 VACC NO PRSV 0.5 ML IM: CPT | Mod: ,,, | Performed by: NURSE PRACTITIONER

## 2023-11-27 PROCEDURE — 86430 RHEUMATOID FACTOR SCREEN: ICD-10-PCS | Mod: ,,, | Performed by: CLINICAL MEDICAL LABORATORY

## 2023-11-27 PROCEDURE — 73502 XR HIP WITH PELVIS WHEN PERFORMED, 2 OR 3 VIEWS LEFT: ICD-10-PCS | Mod: 26,LT,, | Performed by: RADIOLOGY

## 2023-11-27 PROCEDURE — 86038 ANTINUCLEAR ANTIBODIES: CPT | Mod: ,,, | Performed by: CLINICAL MEDICAL LABORATORY

## 2023-11-27 PROCEDURE — 90471 FLU VACCINE (QUAD) GREATER THAN OR EQUAL TO 3YO PRESERVATIVE FREE IM: ICD-10-PCS | Mod: ,,, | Performed by: NURSE PRACTITIONER

## 2023-11-27 PROCEDURE — 86140 C-REACTIVE PROTEIN: CPT | Mod: ,,, | Performed by: CLINICAL MEDICAL LABORATORY

## 2023-11-27 PROCEDURE — 99214 OFFICE O/P EST MOD 30 MIN: CPT | Mod: 25,,, | Performed by: NURSE PRACTITIONER

## 2023-11-27 PROCEDURE — 73502 X-RAY EXAM HIP UNI 2-3 VIEWS: CPT | Mod: 26,LT,, | Performed by: RADIOLOGY

## 2023-11-27 PROCEDURE — 85651 RBC SED RATE NONAUTOMATED: CPT | Mod: ,,, | Performed by: CLINICAL MEDICAL LABORATORY

## 2023-11-27 PROCEDURE — 86430 RHEUMATOID FACTOR TEST QUAL: CPT | Mod: ,,, | Performed by: CLINICAL MEDICAL LABORATORY

## 2023-11-27 PROCEDURE — 86038 ANA EIA W/REFLEX DSDNA/ENA: ICD-10-PCS | Mod: ,,, | Performed by: CLINICAL MEDICAL LABORATORY

## 2023-11-27 PROCEDURE — 85651 SEDIMENTATION RATE, AUTOMATED: ICD-10-PCS | Mod: ,,, | Performed by: CLINICAL MEDICAL LABORATORY

## 2023-11-27 PROCEDURE — 90686 FLU VACCINE (QUAD) GREATER THAN OR EQUAL TO 3YO PRESERVATIVE FREE IM: ICD-10-PCS | Mod: ,,, | Performed by: NURSE PRACTITIONER

## 2023-11-27 PROCEDURE — 86140 C-REACTIVE PROTEIN: ICD-10-PCS | Mod: ,,, | Performed by: CLINICAL MEDICAL LABORATORY

## 2023-11-27 PROCEDURE — 99214 PR OFFICE/OUTPT VISIT, EST, LEVL IV, 30-39 MIN: ICD-10-PCS | Mod: 25,,, | Performed by: NURSE PRACTITIONER

## 2023-11-27 RX ORDER — KETOCONAZOLE 20 MG/ML
1 SHAMPOO, SUSPENSION TOPICAL WEEKLY
COMMUNITY
Start: 2023-08-15

## 2023-11-27 RX ORDER — MELOXICAM 15 MG/1
15 TABLET ORAL DAILY
Qty: 30 TABLET | Refills: 1 | Status: SHIPPED | OUTPATIENT
Start: 2023-11-27 | End: 2024-01-04 | Stop reason: SDUPTHER

## 2023-11-27 NOTE — PROGRESS NOTES
LIZBET Rivera        PATIENT NAME: Kimberly Knutson  : 1985  DATE: 23  MRN: [id number]      Patient PCP Information       Provider PCP Type    Deirdre BACON LIZBET Long General            Reason for Visit / Chief Complaint: Hand Pain, Hip Pain, and Foot Pain (Patient presents to the clinic for left hip pain and right foot and right hand/Rm2/Would like flu shot today.)         History of Present Illness / Problem Focused Workflow     Kimberly Knutson presents to the clinic with Hand Pain, Hip Pain, and Foot Pain (Patient presents to the clinic for left hip pain and right foot and right hand/Rm2/Would like flu shot today.)     Hand Pain   Pertinent negatives include no chest pain.   Hip Pain     Foot Pain  Associated symptoms include arthralgias. Pertinent negatives include no chest pain, headaches, joint swelling, neck pain, vomiting or weakness.       Last Wed left hip impaired gait. Patient states lasted 24 hours.   Worse with weather changes   Bilateral foot pain >1 year   Right greater than left  Right hand pain , carpal tunnel during preg in past   Picking up or opening jars makes pain worse  Radiates up arm, some numbness on ulnar side of right       Review of Systems     Review of Systems   Constitutional:  Negative for activity change and unexpected weight change.   HENT:  Negative for hearing loss, rhinorrhea and trouble swallowing.    Eyes:  Negative for discharge and visual disturbance.   Respiratory:  Negative for chest tightness and wheezing.    Cardiovascular:  Negative for chest pain and palpitations.   Gastrointestinal:  Negative for blood in stool, constipation, diarrhea and vomiting.   Endocrine: Negative for polydipsia and polyuria.   Genitourinary:  Negative for difficulty urinating, dysuria, hematuria and menstrual problem.   Musculoskeletal:  Positive for arthralgias. Negative for joint swelling and neck pain.   Neurological:  Negative for weakness and headaches.    Psychiatric/Behavioral:  Negative for confusion and dysphoric mood.        Medical / Social / Family History     Past Medical History:   Diagnosis Date    Generalized anxiety disorder        Past Surgical History:   Procedure Laterality Date    barium enema      CERVICAL BIOPSY  W/ LOOP ELECTRODE EXCISION       SECTION      2 c sections    COLONOSCOPY      COLPOSCOPY      GALLBLADDER SURGERY      hemorroidectomy      momsd portillo      Tumsd dvais, lovekat lipo, breast augmentation with lift.    pylonidal cyst      REPAIR OF EPIGASTRIC HERNIA      TONSILLECTOMY      wisdom teeth removal      All wisdom teeth       Social History  Ms.  reports that she has never smoked. She has never been exposed to tobacco smoke. She has never used smokeless tobacco. She reports that she does not currently use alcohol after a past usage of about 5.0 standard drinks of alcohol per week. She reports that she does not use drugs.    Family History  Ms.'s family history includes Arthritis in her maternal grandmother and mother; Diabetes in her paternal grandmother; Heart disease in her paternal grandfather; Hypertension in her maternal grandmother; Lung cancer in her maternal grandmother; No Known Problems in her father.    Medications and Allergies     Medications  Outpatient Medications Marked as Taking for the 23 encounter (Office Visit) with Deirdre Long FNP   Medication Sig Dispense Refill    biotin 1 mg Cap Take 1 mg by mouth once daily.      buPROPion (WELLBUTRIN XL) 300 MG 24 hr tablet Take 300 mg by mouth once daily.      cholecalciferol, vitamin D3, 125 mcg (5,000 unit) capsule Take by mouth once daily.      ketoconazole (NIZORAL) 2 % shampoo Apply 1 Dose topically once a week.      naratriptan (AMERGE) 2.5 MG tablet Take 2.5 mg by mouth as needed for Migraine.      promethazine (PHENERGAN) 25 MG tablet TAKE 1/2 TO 1 TABLET BY MOUTH EVERY 6 HOURS AS NEEDED FOR NAUSEA OR HEADACHE      psyllium husk, with  "sugar, 3.4 gram/7 gram Powd Take 1 packet by mouth. metamucil      topiramate (TOPAMAX) 50 MG tablet TAKE 1 TABLET BY MOUTH TWICE DAILY FOR HEADACHE PREVENTION      vitamin E, dl,tocopheryl acet, (VITAMIN E, DL, ACETATE,) 180 mg (400 unit) Cap Take 400 Units by mouth once daily.         Allergies  Review of patient's allergies indicates:   Allergen Reactions    Xylocaine with epinephrine [lidocaine-epinephrine] Swelling     Swelling of tongue       Physical Examination     Vitals:    11/27/23 0942   BP: 118/82   BP Location: Right arm   Patient Position: Sitting   BP Method: Small (Automatic)   Pulse: 81   Resp: 20   Temp: 98.6 °F (37 °C)   TempSrc: Oral   SpO2: 100%   Weight: 67.3 kg (148 lb 6.4 oz)   Height: 5' 10" (1.778 m)       Physical Exam  Vitals and nursing note reviewed.   Constitutional:       Appearance: Normal appearance. She is normal weight.   HENT:      Head: Normocephalic.      Right Ear: External ear normal.      Left Ear: External ear normal.      Nose: Nose normal.      Mouth/Throat:      Mouth: Mucous membranes are moist.   Eyes:      Extraocular Movements: Extraocular movements intact.      Conjunctiva/sclera: Conjunctivae normal.      Pupils: Pupils are equal, round, and reactive to light.   Cardiovascular:      Rate and Rhythm: Normal rate and regular rhythm.      Pulses: Normal pulses.      Heart sounds: Normal heart sounds. No murmur heard.  Pulmonary:      Effort: Pulmonary effort is normal.      Breath sounds: Normal breath sounds. No stridor. No wheezing or rhonchi.   Abdominal:      General: Bowel sounds are normal. There is no distension.      Palpations: Abdomen is soft. There is no mass.      Tenderness: There is no abdominal tenderness.   Musculoskeletal:         General: No swelling or tenderness. Normal range of motion.      Cervical back: Normal range of motion and neck supple.      Right lower leg: No edema.      Left lower leg: No edema.      Right foot: Bunion present.      " Left foot: Bunion present.      Comments: Left hip pain with internal rotation   Skin:     General: Skin is warm and dry.      Capillary Refill: Capillary refill takes less than 2 seconds.   Neurological:      General: No focal deficit present.      Mental Status: She is alert and oriented to person, place, and time. Mental status is at baseline.      Cranial Nerves: No cranial nerve deficit.      Sensory: No sensory deficit.      Motor: No weakness.   Psychiatric:         Mood and Affect: Mood normal.         Behavior: Behavior normal.         Thought Content: Thought content normal.         Judgment: Judgment normal.           No visits with results within 14 Day(s) from this visit.   Latest known visit with results is:   Office Visit on 06/13/2023   Component Date Value Ref Range Status    HIV 1/2 06/13/2023 Non-Reactive  Non-Reactive Final    Hepatitis C Ab 06/13/2023 Non-Reactive  Non-Reactive Final    Sodium 06/13/2023 138  136 - 145 mmol/L Final    Potassium 06/13/2023 3.9  3.5 - 5.1 mmol/L Final    Chloride 06/13/2023 109 (H)  98 - 107 mmol/L Final    CO2 06/13/2023 24  21 - 32 mmol/L Final    Anion Gap 06/13/2023 9  7 - 16 mmol/L Final    Glucose 06/13/2023 88  74 - 106 mg/dL Final    BUN 06/13/2023 15  7 - 18 mg/dL Final    Creatinine 06/13/2023 0.96  0.55 - 1.02 mg/dL Final    BUN/Creatinine Ratio 06/13/2023 16  6 - 20 Final    Calcium 06/13/2023 9.0  8.5 - 10.1 mg/dL Final    Total Protein 06/13/2023 7.1  6.4 - 8.2 g/dL Final    Albumin 06/13/2023 4.3  3.5 - 5.0 g/dL Final    Globulin 06/13/2023 2.8  2.0 - 4.0 g/dL Final    A/G Ratio 06/13/2023 1.5   Final    Bilirubin, Total 06/13/2023 0.7  >0.0 - 1.2 mg/dL Final    Alk Phos 06/13/2023 47  37 - 98 U/L Final    ALT 06/13/2023 19  13 - 56 U/L Final    AST 06/13/2023 12 (L)  15 - 37 U/L Final    eGFR 06/13/2023 77  >=60 mL/min/1.73m2 Final    Triglycerides 06/13/2023 78  35 - 150 mg/dL Final      Normal:  <150 mg/dL  Borderline High: 150-199  mg/dL  High:   200-499 mg/dL  Very High:  >=500    Cholesterol 06/13/2023 198  0 - 200 mg/dL Final      <200 mg/dL:  Desirable  200-240 mg/dL: Borderline High  >240 mg/dL:  High    HDL Cholesterol 06/13/2023 68 (H)  40 - 60 mg/dL Final      <40 mg/dL: Low HDL  40-60 mg/dL: Normal  >60 mg/dL: Desirable    Cholesterol/HDL Ratio (Risk Factor) 06/13/2023 2.9   Final    Non-HDL 06/13/2023 130  mg/dL Final    LDL Calculated 06/13/2023 114  mg/dL Final    Unable to calculate due to one of the following values:  Cholesterol <5  HDL Cholesterol <5  Triglycerides <10 or >400    LDL/HDL 06/13/2023 1.7   Final    Unable to calculate due to one of the following values:  Cholesterol <5  HDL Cholesterol <5  Triglycerides <10 or >400    VLDL 06/13/2023 16  mg/dL Final    WBC 06/13/2023 6.17  4.50 - 11.00 K/uL Final    RBC 06/13/2023 4.88  4.20 - 5.40 M/uL Final    Hemoglobin 06/13/2023 14.3  12.0 - 16.0 g/dL Final    Hematocrit 06/13/2023 42.7  38.0 - 47.0 % Final    MCV 06/13/2023 87.5  80.0 - 96.0 fL Final    MCH 06/13/2023 29.3  27.0 - 31.0 pg Final    MCHC 06/13/2023 33.5  32.0 - 36.0 g/dL Final    RDW 06/13/2023 12.1  11.5 - 14.5 % Final    Platelet Count 06/13/2023 237  150 - 400 K/uL Final    MPV 06/13/2023 9.7  9.4 - 12.4 fL Final    Neutrophils % 06/13/2023 59.0  53.0 - 65.0 % Final    Lymphocytes % 06/13/2023 32.3  27.0 - 41.0 % Final    Monocytes % 06/13/2023 7.0 (H)  2.0 - 6.0 % Final    Eosinophils % 06/13/2023 0.8 (L)  1.0 - 4.0 % Final    Basophils % 06/13/2023 0.6  0.0 - 1.0 % Final    Immature Granulocytes % 06/13/2023 0.3  0.0 - 0.4 % Final    nRBC, Auto 06/13/2023 0.0  <=0.0 % Final    Neutrophils, Abs 06/13/2023 3.64  1.80 - 7.70 K/uL Final    Lymphocytes, Absolute 06/13/2023 1.99  1.00 - 4.80 K/uL Final    Monocytes, Absolute 06/13/2023 0.43  0.00 - 0.80 K/uL Final    Eosinophils, Absolute 06/13/2023 0.05  0.00 - 0.50 K/uL Final    Basophils, Absolute 06/13/2023 0.04  0.00 - 0.20 K/uL Final    Immature  Granulocytes, Absolute 06/13/2023 0.02  0.00 - 0.04 K/uL Final    nRBC, Absolute 06/13/2023 0.00  <=0.00 x10e3/uL Final    Diff Type 06/13/2023 Auto   Final             Assessment and Plan (including Health Maintenance)      Problem List  Smart Sets  Document Outside HM   :    Plan:   Polyarthritis  -     TWYLA EIA w/ Reflex to dsDNA/ERIN; Future; Expected date: 11/27/2023  -     Rheumatoid Factor Screen; Future; Expected date: 11/27/2023  -     Sedimentation Rate; Future; Expected date: 11/27/2023  -     C-Reactive Protein; Future; Expected date: 11/27/2023  -     meloxicam (MOBIC) 15 MG tablet; Take 1 tablet (15 mg total) by mouth once daily.  Dispense: 30 tablet; Refill: 1    Hip pain, unspecified laterality  -     meloxicam (MOBIC) 15 MG tablet; Take 1 tablet (15 mg total) by mouth once daily.  Dispense: 30 tablet; Refill: 1  -     X-Ray Hip 2 or 3 views Left (with Pelvis when performed); Future; Expected date: 11/27/2023    Needs flu shot  -     Influenza - Quadrivalent *Preferred* (6 months+) (PF)     RTC in 1 mo  There are no Patient Instructions on file for this visit.       Health Maintenance Due   Topic Date Due    COVID-19 Vaccine (3 - 2023-24 season) 09/01/2023       Most Recent Immunizations   Administered Date(s) Administered    COVID-19, MRNA, LN-S, PF (Pfizer) (Purple Cap) 01/07/2021    Hepatitis A, Adult 08/14/2020    Influenza 09/18/2020    Influenza - Quadrivalent - PF *Preferred* (6 months and older) 11/27/2023    Tdap 09/23/2014        Problem List Items Addressed This Visit    None  Visit Diagnoses       Polyarthritis    -  Primary    Relevant Medications    meloxicam (MOBIC) 15 MG tablet    Other Relevant Orders    TWYLA EIA w/ Reflex to dsDNA/ERIN    Rheumatoid Factor Screen    Sedimentation Rate    C-Reactive Protein    Hip pain, unspecified laterality        Relevant Medications    meloxicam (MOBIC) 15 MG tablet    Other Relevant Orders    X-Ray Hip 2 or 3 views Left (with Pelvis when performed)  (Completed)    Needs flu shot        Relevant Orders    Influenza - Quadrivalent *Preferred* (6 months+) (PF) (Completed)            Health Maintenance Topics with due status: Not Due       Topic Last Completion Date    TETANUS VACCINE 09/23/2014    Cervical Cancer Screening 02/22/2023       Future Appointments   Date Time Provider Department Center   12/28/2023  9:15 AM Deirdre Long FNP [facility] [place]   6/19/2024  8:30 [facility] [place]            Signature:  LIZBET Rivera  [facility]     Date of encounter: 11/27/23

## 2023-11-28 LAB — ANA SER QL: NEGATIVE

## 2024-01-04 ENCOUNTER — OFFICE VISIT (OUTPATIENT)
Dept: FAMILY MEDICINE | Facility: CLINIC | Age: 39
End: 2024-01-04
Payer: OTHER GOVERNMENT

## 2024-01-04 VITALS
DIASTOLIC BLOOD PRESSURE: 77 MMHG | OXYGEN SATURATION: 97 % | RESPIRATION RATE: 20 BRPM | WEIGHT: 149.19 LBS | BODY MASS INDEX: 21.36 KG/M2 | HEART RATE: 71 BPM | HEIGHT: 70 IN | SYSTOLIC BLOOD PRESSURE: 115 MMHG | TEMPERATURE: 98 F

## 2024-01-04 DIAGNOSIS — M25.559 HIP PAIN, UNSPECIFIED LATERALITY: ICD-10-CM

## 2024-01-04 DIAGNOSIS — Z12.39 ENCOUNTER FOR SCREENING FOR MALIGNANT NEOPLASM OF BREAST, UNSPECIFIED SCREENING MODALITY: ICD-10-CM

## 2024-01-04 DIAGNOSIS — M13.0 POLYARTHRITIS: Primary | ICD-10-CM

## 2024-01-04 DIAGNOSIS — F41.9 ANXIETY: ICD-10-CM

## 2024-01-04 DIAGNOSIS — G43.709 CHRONIC MIGRAINE WITHOUT AURA WITHOUT STATUS MIGRAINOSUS, NOT INTRACTABLE: ICD-10-CM

## 2024-01-04 PROCEDURE — 99213 OFFICE O/P EST LOW 20 MIN: CPT | Mod: ,,, | Performed by: NURSE PRACTITIONER

## 2024-01-04 RX ORDER — MELOXICAM 15 MG/1
15 TABLET ORAL DAILY
Qty: 90 TABLET | Refills: 1 | Status: SHIPPED | OUTPATIENT
Start: 2024-01-04 | End: 2024-07-02

## 2024-01-04 RX ORDER — BUPROPION HYDROCHLORIDE 300 MG/1
300 TABLET ORAL DAILY
Qty: 90 TABLET | Refills: 3 | Status: SHIPPED | OUTPATIENT
Start: 2024-01-04 | End: 2024-12-29

## 2024-01-04 RX ORDER — MELOXICAM 15 MG/1
15 TABLET ORAL DAILY
Qty: 30 TABLET | Refills: 4 | Status: SHIPPED | OUTPATIENT
Start: 2024-01-04 | End: 2024-01-04 | Stop reason: CLARIF

## 2024-01-04 RX ORDER — TOPIRAMATE 50 MG/1
50 TABLET, FILM COATED ORAL 2 TIMES DAILY
Qty: 180 TABLET | Refills: 3 | Status: SHIPPED | OUTPATIENT
Start: 2024-01-04 | End: 2024-12-29

## 2024-01-04 NOTE — PROGRESS NOTES
LIZBET Rivera        PATIENT NAME: Kimberly Knutson  : 1985  DATE: 24  MRN: [id number]      Patient PCP Information       Provider PCP Type    Deirdre BACON LIZBET Long [facility]            Reason for Visit / Chief Complaint: Follow-up and Arthritis (Patient presents to the clinic for 1m f/u/Mammogram would like scheduled.)      History of Present Illness / Problem Focused Workflow     Kimberly Knutson presents to the clinic with Follow-up and Arthritis (Patient presents to the clinic for 1m f/u/Mammogram would like scheduled.)     Follow-up  Pertinent negatives include no abdominal pain, arthralgias, chest pain, chills, congestion, coughing, diaphoresis, fatigue, fever, headaches, joint swelling, myalgias, nausea, numbness, rash, sore throat or weakness.   Arthritis  She reports no joint swelling. Pertinent negatives include no diarrhea, dysuria, fatigue, fever or rash.     Ms Knutson presents to clinic for follow up on arthritis. Hx of pain in hips, wrist, hand and feet.   Patient inflammatory workup negative.   Prev started on mobic. Patient states worked great the first week. Started exercising (squats)  Pain seemed to return slightly. Not as severe but occasional pains still present at times.     Review of Systems     Review of Systems   Constitutional:  Negative for activity change, appetite change, chills, diaphoresis, fatigue, fever and unexpected weight change.   HENT:  Negative for congestion, ear pain, facial swelling, hearing loss, nosebleeds and sore throat.    Respiratory:  Negative for apnea, cough, shortness of breath and wheezing.    Cardiovascular:  Negative for chest pain, palpitations and leg swelling.   Gastrointestinal:  Negative for abdominal distention, abdominal pain, blood in stool, constipation, diarrhea and nausea.   Endocrine: Negative for cold intolerance, heat intolerance, polydipsia, polyphagia and polyuria.   Genitourinary:  Negative for decreased urine volume, difficulty  urinating, dysuria, flank pain, frequency, hematuria and urgency.   Musculoskeletal:  Positive for arthritis. Negative for arthralgias, joint swelling and myalgias.   Skin:  Negative for color change and rash.   Neurological:  Negative for dizziness, tremors, seizures, syncope, facial asymmetry, speech difficulty, weakness, light-headedness, numbness and headaches.   Hematological:  Negative for adenopathy. Does not bruise/bleed easily.   Psychiatric/Behavioral:  Negative for behavioral problems and confusion.        Medical / Social / Family History     Past Medical History:   Diagnosis Date    Generalized anxiety disorder        Past Surgical History:   Procedure Laterality Date    barium enema      CERVICAL BIOPSY  W/ LOOP ELECTRODE EXCISION       SECTION      2 c sections    COLONOSCOPY      COLPOSCOPY      GALLBLADDER SURGERY      hemorroidectomy      mommy makeover      Tummy tuck, lovehandle lipo, breast augmentation with lift.    pylonidal cyst      REPAIR OF EPIGASTRIC HERNIA      TONSILLECTOMY      wisdom teeth removal      All wisdom teeth       Social History  Ms.  reports that she has never smoked. She has never been exposed to tobacco smoke. She has never used smokeless tobacco. She reports that she does not currently use alcohol after a past usage of about 5.0 standard drinks of alcohol per week. She reports that she does not use drugs.    Family History  Ms.'s family history includes Arthritis in her maternal grandmother and mother; Diabetes in her paternal grandmother; Heart disease in her paternal grandfather; Hypertension in her maternal grandmother; Lung cancer in her maternal grandmother; No Known Problems in her father.    Medications and Allergies     Medications  Outpatient Medications Marked as Taking for the 24 encounter (Office Visit) with Deirdre Long FNP   Medication Sig Dispense Refill    biotin 1 mg Cap Take 1 mg by mouth once daily.      cholecalciferol, vitamin D3,  "125 mcg (5,000 unit) capsule Take by mouth once daily.      ketoconazole (NIZORAL) 2 % shampoo Apply 1 Dose topically once a week.      naratriptan (AMERGE) 2.5 MG tablet Take 2.5 mg by mouth as needed for Migraine.      promethazine (PHENERGAN) 25 MG tablet TAKE 1/2 TO 1 TABLET BY MOUTH EVERY 6 HOURS AS NEEDED FOR NAUSEA OR HEADACHE      psyllium husk, with sugar, 3.4 gram/7 gram Powd Take 1 packet by mouth. metamucil      vitamin E, dl,tocopheryl acet, (VITAMIN E, DL, ACETATE,) 180 mg (400 unit) Cap Take 400 Units by mouth once daily.      [DISCONTINUED] buPROPion (WELLBUTRIN XL) 300 MG 24 hr tablet Take 300 mg by mouth once daily.      [DISCONTINUED] meloxicam (MOBIC) 15 MG tablet Take 1 tablet (15 mg total) by mouth once daily. 30 tablet 1    [DISCONTINUED] topiramate (TOPAMAX) 50 MG tablet TAKE 1 TABLET BY MOUTH TWICE DAILY FOR HEADACHE PREVENTION         Allergies  Review of patient's allergies indicates:   Allergen Reactions    Xylocaine with epinephrine [lidocaine-epinephrine] Swelling     Swelling of tongue       Physical Examination     Vitals:    01/04/24 0807   BP: 115/77   BP Location: Right arm   Patient Position: Sitting   BP Method: Medium (Automatic)   Pulse: 71   Resp: 20   Temp: 98.1 °F (36.7 °C)   TempSrc: Oral   SpO2: 97%   Weight: 67.7 kg (149 lb 3.2 oz)   Height: 5' 10" (1.778 m)       Physical Exam  Vitals reviewed.   Constitutional:       Appearance: Normal appearance.   HENT:      Head: Normocephalic.      Right Ear: External ear normal.      Left Ear: External ear normal.      Nose: Nose normal.      Mouth/Throat:      Mouth: Mucous membranes are moist.   Eyes:      Extraocular Movements: Extraocular movements intact.   Cardiovascular:      Rate and Rhythm: Normal rate and regular rhythm.      Pulses: Normal pulses.      Heart sounds: Normal heart sounds.   Pulmonary:      Effort: Pulmonary effort is normal.      Breath sounds: Normal breath sounds.   Abdominal:      General: Abdomen is " flat.   Musculoskeletal:         General: Normal range of motion.      Cervical back: Normal range of motion.   Skin:     General: Skin is warm and dry.      Capillary Refill: Capillary refill takes less than 2 seconds.   Neurological:      General: No focal deficit present.      Mental Status: She is alert and oriented to person, place, and time.   Psychiatric:         Mood and Affect: Mood normal.         Behavior: Behavior normal.         Thought Content: Thought content normal.         Judgment: Judgment normal.           No visits with results within 14 Day(s) from this visit.   Latest known visit with results is:   Office Visit on 11/27/2023   Component Date Value Ref Range Status    TWYLA Screen 11/27/2023 Negative  Negative Final    RA 11/27/2023 Negative  Negative Final    ESR Westergren 11/27/2023 6  0 - 20 mm/Hr Final    CRP 11/27/2023 <0.29  0.00 - 0.80 mg/dL Final             Assessment and Plan (including Health Maintenance)      Problem List  Smart Sets  Document Outside HM   :    Plan:   Polyarthritis  -     meloxicam (MOBIC) 15 MG tablet; Take 1 tablet (15 mg total) by mouth once daily.  Dispense: 90 tablet; Refill: 1    Hip pain, unspecified laterality  -     meloxicam (MOBIC) 15 MG tablet; Take 1 tablet (15 mg total) by mouth once daily.  Dispense: 90 tablet; Refill: 1    Encounter for screening for malignant neoplasm of breast, unspecified screening modality  -     Mammo Digital Screening Bilat w/ [facility]; Future; Expected date: 01/04/2024    Chronic migraine without aura without status migrainosus, not intractable  -     topiramate (TOPAMAX) 50 MG tablet; Take 1 tablet (50 mg total) by mouth 2 (two) times daily.  Dispense: 180 tablet; Refill: 3    Anxiety  -     buPROPion (WELLBUTRIN XL) 300 MG 24 hr tablet; Take 1 tablet (300 mg total) by mouth once daily.  Dispense: 90 tablet; Refill: 3     RTC in 6 mo   Mammogram scheduled   There are no Patient Instructions on file for this visit.       Front Row  Maintenance Due   Topic Date Due    Mammogram  04/01/2022    COVID-19 Vaccine (3 - 2023-24 season) 09/01/2023       Most Recent Immunizations   Administered Date(s) Administered    COVID-19, MRNA, LN-S, PF (Pfizer) (Purple Cap) 01/07/2021    Hepatitis A, Adult 08/14/2020    Influenza 09/18/2020    Influenza - Quadrivalent - PF *Preferred* (6 months and older) 11/27/2023    Tdap 09/23/2014        Problem List Items Addressed This Visit          Neuro    Chronic migraine without aura without status migrainosus, not intractable    Overview     Last Assessment & Plan:   Formatting of this note might be different from the original.  Chronic migraine headaches with previous neg MRI brain. We will continue on current treatment plan with Topamax, Amerge and Phenergan.   We will restart Zanaflex today for neck tension and headache prevention.   We discussed change from Wellbutrin to Elavil but she is not interested at this time.   She reports that she is now living in [facility] and will contact us if she est care with a Neurology clinic closer to home. We will continue plans for a 1 year follow up here.         Relevant Medications    topiramate (TOPAMAX) 50 MG tablet     Other Visit Diagnoses       Polyarthritis    -  Primary    Relevant Medications    meloxicam (MOBIC) 15 MG tablet    Hip pain, unspecified laterality        Relevant Medications    meloxicam (MOBIC) 15 MG tablet    Encounter for screening for malignant neoplasm of breast, unspecified screening modality        Relevant Orders    Mammo Digital Screening Bilat w/ [facility]    Anxiety        Relevant Medications    buPROPion (WELLBUTRIN XL) 300 MG 24 hr tablet            Health Maintenance Topics with due status: Not Due       Topic Last Completion Date    TETANUS VACCINE 09/23/2014    Cervical Cancer Screening 02/22/2023       Future Appointments   Date Time Provider Department Center   3/7/2024 12:40 PM [facility] [place]   6/19/2024  8:30  [facility]   7/8/2024  9:45 Deirdre Deluna FNP [facility] [place]            Signature:  LIZBET Rivera  [facility]     Date of encounter: 1/4/24

## 2024-03-07 ENCOUNTER — HOSPITAL ENCOUNTER (OUTPATIENT)
Dept: RADIOLOGY | Facility: HOSPITAL | Age: 39
Discharge: HOME | End: 2024-03-07
Attending: NURSE PRACTITIONER
Payer: OTHER GOVERNMENT

## 2024-03-07 DIAGNOSIS — Z12.39 ENCOUNTER FOR SCREENING FOR MALIGNANT NEOPLASM OF BREAST, UNSPECIFIED SCREENING MODALITY: ICD-10-CM

## 2024-03-07 PROCEDURE — 77067 SCR MAMMO BI INCL CAD: CPT | Mod: TC

## 2024-06-07 ENCOUNTER — PATIENT MESSAGE (OUTPATIENT)
Dept: FAMILY MEDICINE | Facility: CLINIC | Age: 39
End: 2024-06-07
Payer: OTHER GOVERNMENT

## 2024-06-19 ENCOUNTER — OFFICE VISIT (OUTPATIENT)
Dept: FAMILY MEDICINE | Facility: CLINIC | Age: 39
End: 2024-06-19
Payer: OTHER GOVERNMENT

## 2024-06-19 VITALS
HEART RATE: 80 BPM | WEIGHT: 149 LBS | RESPIRATION RATE: 20 BRPM | TEMPERATURE: 99 F | OXYGEN SATURATION: 97 % | HEIGHT: 70 IN | DIASTOLIC BLOOD PRESSURE: 85 MMHG | SYSTOLIC BLOOD PRESSURE: 119 MMHG | BODY MASS INDEX: 21.33 KG/M2

## 2024-06-19 DIAGNOSIS — Z13.220 SCREENING FOR LIPID DISORDERS: ICD-10-CM

## 2024-06-19 DIAGNOSIS — M25.559 HIP PAIN, UNSPECIFIED LATERALITY: ICD-10-CM

## 2024-06-19 DIAGNOSIS — Z00.00 ROUTINE GENERAL MEDICAL EXAMINATION AT A HEALTH CARE FACILITY: Primary | ICD-10-CM

## 2024-06-19 DIAGNOSIS — M13.0 POLYARTHRITIS: ICD-10-CM

## 2024-06-19 DIAGNOSIS — Z13.1 SCREENING FOR DIABETES MELLITUS: ICD-10-CM

## 2024-06-19 LAB
ALBUMIN SERPL BCP-MCNC: 4.3 G/DL (ref 3.5–5)
ALBUMIN/GLOB SERPL: 1.4 {RATIO}
ALP SERPL-CCNC: 47 U/L (ref 37–98)
ALT SERPL W P-5'-P-CCNC: 22 U/L (ref 13–56)
ANION GAP SERPL CALCULATED.3IONS-SCNC: 11 MMOL/L (ref 7–16)
AST SERPL W P-5'-P-CCNC: 14 U/L (ref 15–37)
BASOPHILS # BLD AUTO: 0.04 K/UL (ref 0–0.2)
BASOPHILS NFR BLD AUTO: 0.6 % (ref 0–1)
BILIRUB SERPL-MCNC: 0.5 MG/DL (ref ?–1.2)
BUN SERPL-MCNC: 18 MG/DL (ref 7–18)
BUN/CREAT SERPL: 19 (ref 6–20)
CALCIUM SERPL-MCNC: 9.1 MG/DL (ref 8.5–10.1)
CHLORIDE SERPL-SCNC: 108 MMOL/L (ref 98–107)
CHOLEST SERPL-MCNC: 198 MG/DL (ref 0–200)
CHOLEST/HDLC SERPL: 3.2 {RATIO}
CO2 SERPL-SCNC: 23 MMOL/L (ref 21–32)
CREAT SERPL-MCNC: 0.93 MG/DL (ref 0.55–1.02)
DIFFERENTIAL METHOD BLD: ABNORMAL
EGFR (NO RACE VARIABLE) (RUSH/TITUS): 80 ML/MIN/1.73M2
EOSINOPHIL # BLD AUTO: 0.21 K/UL (ref 0–0.5)
EOSINOPHIL NFR BLD AUTO: 3.2 % (ref 1–4)
ERYTHROCYTE [DISTWIDTH] IN BLOOD BY AUTOMATED COUNT: 11.9 % (ref 11.5–14.5)
GLOBULIN SER-MCNC: 3.1 G/DL (ref 2–4)
GLUCOSE SERPL-MCNC: 97 MG/DL (ref 74–106)
HCT VFR BLD AUTO: 45.3 % (ref 38–47)
HDLC SERPL-MCNC: 62 MG/DL (ref 40–60)
HGB BLD-MCNC: 15.3 G/DL (ref 12–16)
IMM GRANULOCYTES # BLD AUTO: 0.02 K/UL (ref 0–0.04)
IMM GRANULOCYTES NFR BLD: 0.3 % (ref 0–0.4)
LDLC SERPL CALC-MCNC: 105 MG/DL
LDLC/HDLC SERPL: 1.7 {RATIO}
LYMPHOCYTES # BLD AUTO: 1.93 K/UL (ref 1–4.8)
LYMPHOCYTES NFR BLD AUTO: 29.7 % (ref 27–41)
MCH RBC QN AUTO: 30.1 PG (ref 27–31)
MCHC RBC AUTO-ENTMCNC: 33.8 G/DL (ref 32–36)
MCV RBC AUTO: 89 FL (ref 80–96)
MONOCYTES # BLD AUTO: 0.44 K/UL (ref 0–0.8)
MONOCYTES NFR BLD AUTO: 6.8 % (ref 2–6)
MPC BLD CALC-MCNC: 10.2 FL (ref 9.4–12.4)
NEUTROPHILS # BLD AUTO: 3.85 K/UL (ref 1.8–7.7)
NEUTROPHILS NFR BLD AUTO: 59.4 % (ref 53–65)
NONHDLC SERPL-MCNC: 136 MG/DL
NRBC # BLD AUTO: 0 X10E3/UL
NRBC, AUTO (.00): 0 %
PLATELET # BLD AUTO: 233 K/UL (ref 150–400)
POTASSIUM SERPL-SCNC: 4.2 MMOL/L (ref 3.5–5.1)
PROT SERPL-MCNC: 7.4 G/DL (ref 6.4–8.2)
RBC # BLD AUTO: 5.09 M/UL (ref 4.2–5.4)
SODIUM SERPL-SCNC: 138 MMOL/L (ref 136–145)
TRIGL SERPL-MCNC: 154 MG/DL (ref 35–150)
VLDLC SERPL-MCNC: 31 MG/DL
WBC # BLD AUTO: 6.49 K/UL (ref 4.5–11)

## 2024-06-19 PROCEDURE — 80053 COMPREHEN METABOLIC PANEL: CPT | Mod: ,,, | Performed by: CLINICAL MEDICAL LABORATORY

## 2024-06-19 PROCEDURE — 99396 PREV VISIT EST AGE 40-64: CPT | Mod: ,,, | Performed by: NURSE PRACTITIONER

## 2024-06-19 PROCEDURE — 80061 LIPID PANEL: CPT | Mod: ,,, | Performed by: CLINICAL MEDICAL LABORATORY

## 2024-06-19 PROCEDURE — 85025 COMPLETE CBC W/AUTO DIFF WBC: CPT | Mod: ,,, | Performed by: CLINICAL MEDICAL LABORATORY

## 2024-06-19 RX ORDER — ACETAMINOPHEN AND PHENYLEPHRINE HCL 325; 5 MG/1; MG/1
TABLET ORAL
COMMUNITY

## 2024-06-19 RX ORDER — MELOXICAM 15 MG/1
15 TABLET ORAL
Qty: 90 TABLET | Refills: 3 | Status: SHIPPED | OUTPATIENT
Start: 2024-06-19

## 2024-06-19 NOTE — PROGRESS NOTES
LIZBET Rivera        PATIENT NAME: Kimberly Knutson  : 1985  DATE: 24  MRN: [id number]      Patient PCP Information       Provider PCP Type    Deirdre BACON LIZBET Long [facility]          Chief Complaint      Chief Complaint   Patient presents with    wellness     Pt presents to the clinic for wellness       History of Present Illness        Kimberly Knutson is a 38 y.o. female who presents for routine wellness visit. Pt states she is doing well with no acute complaints. Pt denies  FHx of breast,or cervical cancer. Paternal aunt hx of colon cancer, . Last colonoscopy was [facility] 3/2017with 45 year old screening recs. Last mammogram was 3/7/2024 scheduled for 3/2025. Last pap was 2023, next due 2026. Flu shot available in . Nonsmoker. Working on improving diet and exercise.    Past Medical History:  Past Medical History:   Diagnosis Date    Generalized anxiety disorder        Past Surgical History:   has a past surgical history that includes Tonsillectomy; Gallbladder surgery; Repair of epigastric hernia; pylonidal cyst; Cervical biopsy w/ loop electrode excision; hemorroidectomy; wisdom teeth removal;  section; Colonoscopy; barium enema; Colposcopy; mommy makeover; and Augmentation of breast.    Social History:  Social History     Tobacco Use    Smoking status: Never     Passive exposure: Never    Smokeless tobacco: Never   Substance Use Topics    Alcohol use: Not Currently     Alcohol/week: 5.0 standard drinks of alcohol     Types: 2 Glasses of wine, 3 Cans of beer per week     Comment: 5-6 drinks total    Drug use: Never       I personally reviewed all past medical, surgical, and social.     Review of Systems   Constitutional:  Negative for activity change, appetite change, chills, diaphoresis, fatigue, fever and unexpected weight change.   HENT:  Negative for congestion, ear pain, facial swelling, hearing loss, nosebleeds and sore throat.    Eyes: Negative.    Respiratory:   Negative for apnea, cough, shortness of breath and wheezing.    Cardiovascular:  Negative for chest pain, palpitations and leg swelling.   Gastrointestinal:  Negative for abdominal distention, abdominal pain, blood in stool, constipation, diarrhea and nausea.   Endocrine: Negative for cold intolerance, heat intolerance, polydipsia, polyphagia and polyuria.   Genitourinary:  Negative for decreased urine volume, difficulty urinating, dysuria, flank pain, frequency, hematuria and urgency.   Musculoskeletal:  Negative for arthralgias, joint swelling and myalgias.   Skin:  Negative for color change and rash.   Allergic/Immunologic: Negative.    Neurological:  Negative for dizziness, tremors, seizures, syncope, facial asymmetry, speech difficulty, weakness, light-headedness, numbness and headaches.   Hematological:  Negative for adenopathy. Does not bruise/bleed easily.   Psychiatric/Behavioral:  Negative for behavioral problems and confusion.         Medications:  Outpatient Encounter Medications as of 6/19/2024   Medication Sig Dispense Refill    biotin 10,000 mcg Cap 96921 daily      buPROPion (WELLBUTRIN XL) 300 MG 24 hr tablet Take 1 tablet (300 mg total) by mouth once daily. 90 tablet 3    cholecalciferol, vitamin D3, 125 mcg (5,000 unit) capsule Take by mouth once daily.      ketoconazole (NIZORAL) 2 % shampoo Apply 1 Dose topically once a week.      meloxicam (MOBIC) 15 MG tablet TAKE 1 TABLET DAILY 90 tablet 3    naratriptan (AMERGE) 2.5 MG tablet Take 2.5 mg by mouth as needed for Migraine.      promethazine (PHENERGAN) 25 MG tablet TAKE 1/2 TO 1 TABLET BY MOUTH EVERY 6 HOURS AS NEEDED FOR NAUSEA OR HEADACHE      psyllium husk, with sugar, 3.4 gram/7 gram Powd Take 1 packet by mouth. metamucil      topiramate (TOPAMAX) 50 MG tablet Take 1 tablet (50 mg total) by mouth 2 (two) times daily. 180 tablet 3    vitamin D3-levomefolate 125 mcg (5,000 unit)-15 mg CpDR       vitamin E, dl,tocopheryl acet, (VITAMIN E, DL,  "ACETATE,) 180 mg (400 unit) Cap Take 400 Units by mouth once daily.      [DISCONTINUED] meloxicam (MOBIC) 15 MG tablet Take 1 tablet (15 mg total) by mouth once daily. 90 tablet 1    [DISCONTINUED] biotin 1 mg Cap Take 1 mg by mouth once daily. (Patient not taking: Reported on 6/19/2024)      [DISCONTINUED] vitamin E 100 UNIT capsule        No facility-administered encounter medications on file as of 6/19/2024.       Allergies:  Review of patient's allergies indicates:   Allergen Reactions    Xylocaine with epinephrine [lidocaine-epinephrine] Swelling     Swelling of tongue       Health Maintenance:  Immunization History   Administered Date(s) Administered    COVID-19, MRNA, LN-S, PF (Pfizer) (Purple Cap) 12/18/2020, 01/07/2021    Hepatitis A, Adult 01/27/2020, 08/14/2020    Influenza 09/12/2012, 09/23/2014, 09/25/2017, 10/04/2019, 09/18/2020    Influenza - Quadrivalent - PF *Preferred* (6 months and older) 11/27/2023    Tdap 09/23/2014      Health Maintenance   Topic Date Due    TETANUS VACCINE  09/23/2024    Mammogram  03/07/2025    Hepatitis C Screening  Completed    Lipid Panel  Completed        Physical Exam      Vital Signs  Temp: 98.5 °F (36.9 °C)  Temp Source: Oral  Pulse: 80  Resp: 20  SpO2: 97 %  BP: 119/85  BP Location: Right arm  Patient Position: Sitting  Pain Score: 0-No pain  Height and Weight  Height: 5' 10" (177.8 cm)  Weight: 67.6 kg (149 lb)  BSA (Calculated - sq m): 1.83 sq meters  BMI (Calculated): 21.4  Weight in (lb) to have BMI = 25: 173.9]  Over the last two weeks how often have you been bothered by little interest or pleasure in doing things: 1  Over the last two weeks how often have you been bothered by feeling down, depressed or hopeless: 1  PHQ-2 Total Score: 2  PHQ-9 Score: 4  PHQ-9 Interpretation: Minimal or None      Physical Exam  Vitals reviewed.   Constitutional:       Appearance: Normal appearance.   HENT:      Head: Normocephalic.      Right Ear: Tympanic membrane, ear canal and " external ear normal.      Left Ear: Tympanic membrane, ear canal and external ear normal.      Nose: Nose normal.      Mouth/Throat:      Mouth: Mucous membranes are moist.   Eyes:      Extraocular Movements: Extraocular movements intact.   Cardiovascular:      Rate and Rhythm: Normal rate and regular rhythm.      Pulses: Normal pulses.      Heart sounds: Normal heart sounds.   Pulmonary:      Effort: Pulmonary effort is normal. No respiratory distress.      Breath sounds: Normal breath sounds. No stridor. No wheezing, rhonchi or rales.   Chest:      Chest wall: No tenderness.   Abdominal:      General: Abdomen is flat. Bowel sounds are normal.   Musculoskeletal:         General: Normal range of motion.      Cervical back: Normal range of motion.   Skin:     General: Skin is warm and dry.      Capillary Refill: Capillary refill takes less than 2 seconds.   Neurological:      General: No focal deficit present.      Mental Status: She is alert and oriented to person, place, and time.   Psychiatric:         Mood and Affect: Mood normal.         Behavior: Behavior normal.         Thought Content: Thought content normal.         Judgment: Judgment normal.              Is patient >64 yo of age?       No data to display                  Laboratory:  CBC:  Recent Labs   Lab 06/13/23  1143 06/19/24  0918   WBC 6.17 6.49   RBC 4.88 5.09   Hemoglobin 14.3 15.3   Hematocrit 42.7 45.3   Platelet Count 237 233   MCV 87.5 89.0   MCH 29.3 30.1   MCHC 33.5 33.8     CMP:  Recent Labs   Lab 06/13/23  1143 06/19/24  0918   Glucose 88 97   Calcium 9.0 9.1   Albumin 4.3 4.3   Total Protein 7.1 7.4   Sodium 138 138   Potassium 3.9 4.2   CO2 24 23   Chloride 109 H 108 H   BUN 15 18   Alk Phos 47 47   ALT 19 22   AST 12 L 14 L   Bilirubin, Total 0.7 0.5     LIPIDS:  Recent Labs   Lab 06/13/23  1143 06/19/24  0918   HDL Cholesterol 68 H 62 H   Cholesterol 198 198   Triglycerides 78 154 H   LDL Calculated 114 105   Cholesterol/HDL Ratio (Risk  Factor) 2.9 3.2   Non- 136     TSH:      A1C:        Assessment/Plan     Kimberly Knutson is a 38 y.o.female with:     1. Routine general medical examination at a health care facility  - Comprehensive Metabolic Panel; Future  - CBC Auto Differential; Future  - Lipid Panel; Future    2. Screening for diabetes mellitus  - Comprehensive Metabolic Panel; Future    3. Screening for lipid disorders  - Lipid Panel; Future         Total time spent face-to-face and non-face-to-face coordinating care for this encounter was: >30 min    Chronic conditions status updated as per HPI.  Other than changes above, cont current medications and maintain follow up with specialists.  Return to clinic 1 year next wellness.  Deirdre ANTOINE

## 2024-07-01 ENCOUNTER — OFFICE VISIT (OUTPATIENT)
Dept: FAMILY MEDICINE | Facility: CLINIC | Age: 39
End: 2024-07-01
Payer: OTHER GOVERNMENT

## 2024-07-01 VITALS
DIASTOLIC BLOOD PRESSURE: 76 MMHG | TEMPERATURE: 98 F | WEIGHT: 152 LBS | SYSTOLIC BLOOD PRESSURE: 124 MMHG | RESPIRATION RATE: 20 BRPM | HEART RATE: 67 BPM | OXYGEN SATURATION: 100 % | BODY MASS INDEX: 21.76 KG/M2 | HEIGHT: 70 IN

## 2024-07-01 DIAGNOSIS — R39.9 UTI SYMPTOMS: ICD-10-CM

## 2024-07-01 DIAGNOSIS — N30.01 ACUTE CYSTITIS WITH HEMATURIA: Primary | ICD-10-CM

## 2024-07-01 LAB
BILIRUB SERPL-MCNC: NEGATIVE MG/DL
BLOOD URINE, POC: ABNORMAL
COLOR, POC UA: YELLOW
GLUCOSE UR QL STRIP: NEGATIVE
KETONES UR QL STRIP: NEGATIVE
LEUKOCYTE ESTERASE URINE, POC: ABNORMAL
NITRITE, POC UA: NEGATIVE
PH, POC UA: 7
PROTEIN, POC: NEGATIVE
SPECIFIC GRAVITY, POC UA: 1.01
UROBILINOGEN, POC UA: 0.2

## 2024-07-01 PROCEDURE — 99213 OFFICE O/P EST LOW 20 MIN: CPT | Mod: 25,,,

## 2024-07-01 PROCEDURE — 96372 THER/PROPH/DIAG INJ SC/IM: CPT | Mod: ,,,

## 2024-07-01 PROCEDURE — 81003 URINALYSIS AUTO W/O SCOPE: CPT | Mod: QW,,,

## 2024-07-01 RX ORDER — FLUCONAZOLE 150 MG/1
TABLET ORAL
Qty: 1 TABLET | Refills: 0 | Status: SHIPPED | OUTPATIENT
Start: 2024-07-01

## 2024-07-01 RX ORDER — NITROFURANTOIN 25; 75 MG/1; MG/1
100 CAPSULE ORAL 2 TIMES DAILY
Qty: 14 CAPSULE | Refills: 0 | Status: SHIPPED | OUTPATIENT
Start: 2024-07-01 | End: 2024-07-08

## 2024-07-01 RX ORDER — CEFTRIAXONE 1 G/1
1 INJECTION, POWDER, FOR SOLUTION INTRAMUSCULAR; INTRAVENOUS
Status: COMPLETED | OUTPATIENT
Start: 2024-07-01 | End: 2024-07-01

## 2024-07-01 RX ADMIN — CEFTRIAXONE 1 G: 1 INJECTION, POWDER, FOR SOLUTION INTRAMUSCULAR; INTRAVENOUS at 08:07

## 2024-07-01 NOTE — PROGRESS NOTES
"Subjective:       History was provided by the patient.  Kimberly Knutson is a 38 y.o. female here for evaluation of dysuria, frequency, and hematuria beginning 2 days ago. Fever has been absent. Other associated symptoms include: dysuria. Symptoms which are not present include: abdominal pain, back pain, cloudy urine, and vaginal discharge. Denies fever, nausea, or vomiting. Medication administered in clinic. Medication sent to pharmacy. Return to clinic if symptoms worsen and as needed.     Review of Systems  Pertinent items are noted in HPI      Objective:      /76 (BP Location: Right arm, Patient Position: Sitting, BP Method: Small (Automatic))   Pulse 67   Temp 97.9 °F (36.6 °C) (Oral)   Resp 20   Ht 5' 10" (1.778 m)   Wt 68.9 kg (152 lb)   SpO2 100%   BMI 21.81 kg/m²   General: alert, appears stated age, and cooperative  Heart : rate and rhythm normal  Lungs: clear to auscultation   Abdomen: soft, non-tender, without masses or organomegaly   CVA Tenderness: absent   : exam deferred     Lab review  Urine dip: pos blood and WBC      Assessment:      Likely UTI.      Plan:      Urine culture pending  Medications sent to pharmacy  Return to clinic if symptoms worsen and as needed.     "

## 2024-07-01 NOTE — PATIENT INSTRUCTIONS
Medications sent to pharmacy.  Urine culture pending  Return to clinic if symptoms worsen and as needed.

## 2024-11-04 ENCOUNTER — OFFICE VISIT (OUTPATIENT)
Dept: FAMILY MEDICINE | Facility: CLINIC | Age: 39
End: 2024-11-04
Payer: OTHER GOVERNMENT

## 2024-11-04 VITALS
RESPIRATION RATE: 20 BRPM | HEART RATE: 77 BPM | DIASTOLIC BLOOD PRESSURE: 76 MMHG | TEMPERATURE: 99 F | SYSTOLIC BLOOD PRESSURE: 106 MMHG | OXYGEN SATURATION: 98 % | HEIGHT: 70 IN | WEIGHT: 143 LBS | BODY MASS INDEX: 20.47 KG/M2

## 2024-11-04 DIAGNOSIS — R09.89 SYMPTOMS OF UPPER RESPIRATORY INFECTION (URI): ICD-10-CM

## 2024-11-04 DIAGNOSIS — R31.9 HEMATURIA, UNSPECIFIED TYPE: ICD-10-CM

## 2024-11-04 DIAGNOSIS — R42 DIZZINESS: ICD-10-CM

## 2024-11-04 DIAGNOSIS — R19.7 DIARRHEA, UNSPECIFIED TYPE: Primary | ICD-10-CM

## 2024-11-04 DIAGNOSIS — R11.0 NAUSEA: ICD-10-CM

## 2024-11-04 DIAGNOSIS — R51.9 INTRACTABLE HEADACHE, UNSPECIFIED CHRONICITY PATTERN, UNSPECIFIED HEADACHE TYPE: ICD-10-CM

## 2024-11-04 DIAGNOSIS — R10.9 ABDOMINAL PAIN, UNSPECIFIED ABDOMINAL LOCATION: ICD-10-CM

## 2024-11-04 PROBLEM — N30.01 ACUTE CYSTITIS WITH HEMATURIA: Status: RESOLVED | Noted: 2024-07-01 | Resolved: 2024-11-04

## 2024-11-04 PROBLEM — R39.9 UTI SYMPTOMS: Status: RESOLVED | Noted: 2024-07-01 | Resolved: 2024-11-04

## 2024-11-04 LAB
BASOPHILS # BLD AUTO: 0.05 K/UL (ref 0–0.2)
BASOPHILS NFR BLD AUTO: 0.7 % (ref 0–1)
BILIRUB SERPL-MCNC: NEGATIVE MG/DL
BLOOD URINE, POC: ABNORMAL
CLARITY, UA: CLEAR
COLOR, UA: YELLOW
CTP QC/QA: YES
DIFFERENTIAL METHOD BLD: ABNORMAL
EOSINOPHIL # BLD AUTO: 0.06 K/UL (ref 0–0.5)
EOSINOPHIL NFR BLD AUTO: 0.8 % (ref 1–4)
ERYTHROCYTE [DISTWIDTH] IN BLOOD BY AUTOMATED COUNT: 11.9 % (ref 11.5–14.5)
GLUCOSE UR QL STRIP: NEGATIVE
HCT VFR BLD AUTO: 43 % (ref 38–47)
HGB BLD-MCNC: 15.1 G/DL (ref 12–16)
IMM GRANULOCYTES # BLD AUTO: 0.02 K/UL (ref 0–0.04)
IMM GRANULOCYTES NFR BLD: 0.3 % (ref 0–0.4)
KETONES UR QL STRIP: NEGATIVE
LEUKOCYTE ESTERASE URINE, POC: NEGATIVE
LYMPHOCYTES # BLD AUTO: 1.38 K/UL (ref 1–4.8)
LYMPHOCYTES NFR BLD AUTO: 18.4 % (ref 27–41)
MCH RBC QN AUTO: 29.9 PG (ref 27–31)
MCHC RBC AUTO-ENTMCNC: 35.1 G/DL (ref 32–36)
MCV RBC AUTO: 85.1 FL (ref 80–96)
MOLECULAR STREP A: NEGATIVE
MONOCYTES # BLD AUTO: 0.88 K/UL (ref 0–0.8)
MONOCYTES NFR BLD AUTO: 11.8 % (ref 2–6)
MPC BLD CALC-MCNC: 10 FL (ref 9.4–12.4)
NEUTROPHILS # BLD AUTO: 5.09 K/UL (ref 1.8–7.7)
NEUTROPHILS NFR BLD AUTO: 68 % (ref 53–65)
NITRITE, POC UA: NEGATIVE
NRBC # BLD AUTO: 0 X10E3/UL
NRBC, AUTO (.00): 0 %
PH, POC UA: 6.5
PLATELET # BLD AUTO: 214 K/UL (ref 150–400)
POC MOLECULAR INFLUENZA A AGN: NEGATIVE
POC MOLECULAR INFLUENZA B AGN: NEGATIVE
PROTEIN, POC: NEGATIVE
RBC # BLD AUTO: 5.05 M/UL (ref 4.2–5.4)
SARS-COV-2 RDRP RESP QL NAA+PROBE: NEGATIVE
SPECIFIC GRAVITY, POC UA: 1.01
UROBILINOGEN, POC UA: 0.2
WBC # BLD AUTO: 7.48 K/UL (ref 4.5–11)

## 2024-11-04 PROCEDURE — 87086 URINE CULTURE/COLONY COUNT: CPT | Mod: ,,, | Performed by: CLINICAL MEDICAL LABORATORY

## 2024-11-04 PROCEDURE — 87635 SARS-COV-2 COVID-19 AMP PRB: CPT | Mod: QW,,, | Performed by: NURSE PRACTITIONER

## 2024-11-04 PROCEDURE — 87651 STREP A DNA AMP PROBE: CPT | Mod: QW,,, | Performed by: NURSE PRACTITIONER

## 2024-11-04 PROCEDURE — 99214 OFFICE O/P EST MOD 30 MIN: CPT | Mod: ,,, | Performed by: NURSE PRACTITIONER

## 2024-11-04 PROCEDURE — 81003 URINALYSIS AUTO W/O SCOPE: CPT | Mod: QW,,, | Performed by: NURSE PRACTITIONER

## 2024-11-04 PROCEDURE — 87502 INFLUENZA DNA AMP PROBE: CPT | Mod: QW,,, | Performed by: NURSE PRACTITIONER

## 2024-11-04 PROCEDURE — 85025 COMPLETE CBC W/AUTO DIFF WBC: CPT | Mod: ,,, | Performed by: CLINICAL MEDICAL LABORATORY

## 2024-11-04 RX ORDER — ONDANSETRON 4 MG/1
8 TABLET, ORALLY DISINTEGRATING ORAL EVERY 8 HOURS PRN
Qty: 30 TABLET | Refills: 0 | Status: SHIPPED | OUTPATIENT
Start: 2024-11-04

## 2024-11-04 RX ORDER — NITROFURANTOIN 25; 75 MG/1; MG/1
100 CAPSULE ORAL 2 TIMES DAILY
Qty: 20 CAPSULE | Refills: 0 | Status: SHIPPED | OUTPATIENT
Start: 2024-11-04 | End: 2024-11-14

## 2024-11-04 RX ORDER — VALACYCLOVIR HYDROCHLORIDE 1 G/1
1000 TABLET, FILM COATED ORAL
COMMUNITY

## 2024-11-04 NOTE — PROGRESS NOTES
LIZBET Rivera        PATIENT NAME: Kimberly Knutson  : 1985  DATE: 24  MRN: [id number]      Patient PCP Information       Provider PCP Type    Deirdre BACON LIZBET Long General            Reason for Visit / Chief Complaint: Nausea, Headache, Fever, Dizziness, Fatigue, and Diarrhea       Update PCP  Update Chief Complaint         History of Present Illness / Problem Focused Workflow     Kimberly Knutson presents to the clinic with Nausea, Headache, Fever, Dizziness, Fatigue, and Diarrhea     HPI  Patient presents to clinic with reported nausea, ha, fever 101 at home, dizziness, fatigue and diarrhea which started over the weekend. Patient does reports slowed frequency in BM in last 24 hours. Patient afebrile in clinic today.   Patient has IUD.   Prior cholecystectomy.   Lower abdominal discomfort on exam.   Hx of Ecoil UTI several months ago.    Medical / Social / Family History     Past Medical History:   Diagnosis Date    Generalized anxiety disorder        Past Surgical History:   Procedure Laterality Date    AUGMENTATION OF BREAST      barium enema      CERVICAL BIOPSY  W/ LOOP ELECTRODE EXCISION       SECTION      2 c sections    COLONOSCOPY      COLPOSCOPY      GALLBLADDER SURGERY      hemorroidectomy      mommy lindsey      [place] lipo, breast augmentation with lift.    pylonidal cyst      REPAIR OF EPIGASTRIC HERNIA      TONSILLECTOMY      wisdom teeth removal      All wisdom teeth       Social History  Ms.  reports that she has never smoked. She has never been exposed to tobacco smoke. She has never used smokeless tobacco. She reports that she does not currently use alcohol after a past usage of about 5.0 standard drinks of alcohol per week. She reports that she does not use drugs.    Family History  Ms.'s family history includes Arthritis in her maternal grandmother and mother; Diabetes in her paternal grandmother; Heart disease in her paternal grandfather; Hypertension in  "her maternal grandmother; Lung cancer in her maternal grandmother; No Known Problems in her father.    Medications and Allergies     Medications  Outpatient Medications Marked as Taking for the 11/4/24 encounter (Office Visit) with Deirdre Long FNP   Medication Sig Dispense Refill    biotin 10,000 mcg Cap 91808 daily      buPROPion (WELLBUTRIN XL) 300 MG 24 hr tablet Take 1 tablet (300 mg total) by mouth once daily. 90 tablet 3    cholecalciferol, vitamin D3, 125 mcg (5,000 unit) capsule Take by mouth once daily.      fluconazole (DIFLUCAN) 150 MG Tab Take 1 tablet by mouth now, then repeat on day 3 1 tablet 0    ketoconazole (NIZORAL) 2 % shampoo Apply 1 Dose topically once a week.      meloxicam (MOBIC) 15 MG tablet TAKE 1 TABLET DAILY 90 tablet 3    naratriptan (AMERGE) 2.5 MG tablet Take 2.5 mg by mouth as needed for Migraine.      promethazine (PHENERGAN) 25 MG tablet TAKE 1/2 TO 1 TABLET BY MOUTH EVERY 6 HOURS AS NEEDED FOR NAUSEA OR HEADACHE      psyllium husk, with sugar, 3.4 gram/7 gram Powd Take 1 packet by mouth. metamucil      topiramate (TOPAMAX) 50 MG tablet Take 1 tablet (50 mg total) by mouth 2 (two) times daily. 180 tablet 3    valACYclovir (VALTREX) 1000 MG tablet Take 1,000 mg by mouth as needed (.).      vitamin E, dl,tocopheryl acet, (VITAMIN E, DL, ACETATE,) 180 mg (400 unit) Cap Take 400 Units by mouth once daily.         Allergies  Review of patient's allergies indicates:   Allergen Reactions    Xylocaine with epinephrine [lidocaine-epinephrine] Swelling     Swelling of tongue       Physical Examination     Vitals:    11/04/24 0848   BP: 106/76   BP Location: Right arm   Patient Position: Sitting   Pulse: 77   Resp: 20   Temp: 98.5 °F (36.9 °C)   TempSrc: Oral   SpO2: 98%   Weight: 64.9 kg (143 lb)   Height: 5' 10" (1.778 m)       Physical Exam  Vitals and nursing note reviewed.   Constitutional:       Appearance: Normal appearance. She is normal weight.   HENT:      Head: " Normocephalic.      Right Ear: External ear normal.      Left Ear: External ear normal.      Nose: Nose normal.      Mouth/Throat:      Mouth: Mucous membranes are moist.   Eyes:      Extraocular Movements: Extraocular movements intact.      Conjunctiva/sclera: Conjunctivae normal.      Pupils: Pupils are equal, round, and reactive to light.   Cardiovascular:      Rate and Rhythm: Normal rate and regular rhythm.      Pulses: Normal pulses.      Heart sounds: Normal heart sounds. No murmur heard.  Pulmonary:      Effort: Pulmonary effort is normal.      Breath sounds: Normal breath sounds. No stridor. No wheezing or rhonchi.   Abdominal:      General: Bowel sounds are normal. There is no distension.      Palpations: Abdomen is soft. There is no mass.      Tenderness: There is abdominal tenderness.      Comments: Generalized lower abdominal tenderness    Musculoskeletal:         General: No swelling or tenderness. Normal range of motion.      Cervical back: Normal range of motion and neck supple.      Right lower leg: No edema.      Left lower leg: No edema.   Skin:     General: Skin is warm and dry.      Capillary Refill: Capillary refill takes less than 2 seconds.   Neurological:      General: No focal deficit present.      Mental Status: She is alert and oriented to person, place, and time. Mental status is at baseline.      Cranial Nerves: No cranial nerve deficit.      Sensory: No sensory deficit.      Motor: No weakness.   Psychiatric:         Mood and Affect: Mood normal.         Behavior: Behavior normal.         Thought Content: Thought content normal.         Judgment: Judgment normal.           Office Visit on 11/04/2024   Component Date Value Ref Range Status    POC Rapid COVID 11/04/2024 Negative  Negative Final     Acceptable 11/04/2024 Yes   Final    POC Molecular Influenza A Ag 11/04/2024 Negative  Negative Final    POC Molecular Influenza B Ag 11/04/2024 Negative  Negative Final      Acceptable 11/04/2024 Yes   Final    Molecular Strep A, POC 11/04/2024 Negative  Negative Final     Acceptable 11/04/2024 Yes   Final    Color, UA POC 11/04/2024 Yellow   Final    Clarity, UA, POC 11/04/2024 Clear   Final    Spec Grav UA 11/04/2024 1.010   Final    pH, UA 11/04/2024 6.5   Final    WBC, UA 11/04/2024 Negative   Final    Nitrite, UA 11/04/2024 Negative   Final    Protein, POC 11/04/2024 Negative   Final    Glucose, UA 11/04/2024 Negative   Final    Ketones, UA 11/04/2024 Negative   Final    Bilirubin, POC 11/04/2024 Negative   Final    Urobilinogen, UA 11/04/2024 0.2   Final    Blood, UA 11/04/2024 Moderate   Final             Assessment and Plan (including Health Maintenance)      Problem List  Smart Sets  Document Outside HM   :    Plan:     1. Diarrhea, unspecified type  -     POCT COVID-19 Rapid Screening  -     POCT Influenza A/B Molecular  -     POCT Strep A, Molecular  -     POCT URINALYSIS W/O SCOPE  -     CBC Auto Differential; Future; Expected date: 11/04/2024    2. Intractable headache, unspecified chronicity pattern, unspecified headache type  -     POCT COVID-19 Rapid Screening  -     POCT Influenza A/B Molecular  -     POCT Strep A, Molecular  -     POCT URINALYSIS W/O SCOPE    3. Nausea  -     POCT COVID-19 Rapid Screening  -     POCT Influenza A/B Molecular  -     POCT Strep A, Molecular  -     POCT URINALYSIS W/O SCOPE  -     ondansetron (ZOFRAN-ODT) 4 MG TbDL; Take 2 tablets (8 mg total) by mouth every 8 (eight) hours as needed (nasuea).  Dispense: 30 tablet; Refill: 0    4. Dizziness  -     POCT COVID-19 Rapid Screening  -     POCT Influenza A/B Molecular  -     POCT Strep A, Molecular  -     POCT URINALYSIS W/O SCOPE    5. Symptoms of upper respiratory infection (URI)  -     POCT COVID-19 Rapid Screening  -     POCT Influenza A/B Molecular  -     POCT Strep A, Molecular  -     POCT URINALYSIS W/O SCOPE    6. Abdominal pain, unspecified abdominal  location  -     POCT Strep A, Molecular  -     POCT URINALYSIS W/O SCOPE    7. Hematuria, unspecified type  -     Urine culture; Future; Expected date: 11/04/2024  -     nitrofurantoin, macrocrystal-monohydrate, (MACROBID) 100 MG capsule; Take 1 capsule (100 mg total) by mouth 2 (two) times daily. for 10 days  Dispense: 20 capsule; Refill: 0    UTI vs viral illness.   RTC if symptoms fail to improve  Increase oral hydration  Urine cx pending    There are no Patient Instructions on file for this visit.       Health Maintenance Due   Topic Date Due    Influenza Vaccine (1) 09/01/2024    COVID-19 Vaccine (3 - 2024-25 season) 09/01/2024    TETANUS VACCINE  09/23/2024       Most Recent Immunizations   Administered Date(s) Administered    COVID-19, MRNA, LN-S, PF (Pfizer) (Purple Cap) 01/07/2021    Hepatitis A, Adult 08/14/2020    Influenza 09/18/2020    Influenza - Quadrivalent - PF *Preferred* (6 months and older) 11/27/2023    Tdap 09/23/2014            Health Maintenance Topics with due status: Not Due       Topic Last Completion Date    Cervical Cancer Screening 02/22/2023    Mammogram 03/07/2024    RSV Vaccine (Age 60+ and Pregnant patients) Not Due       Future Appointments   Date Time Provider Department Center   3/13/2025  1:00 PM [facility] [place]   6/24/2025  8:30 AM Deirdre Long FNP [facility] [place]            Signature:  LIZBET Rivera  [facility]     Date of encounter: 11/4/24

## 2024-11-06 LAB — UA COMPLETE W REFLEX CULTURE PNL UR: NO GROWTH

## 2024-11-11 ENCOUNTER — PATIENT MESSAGE (OUTPATIENT)
Dept: FAMILY MEDICINE | Facility: CLINIC | Age: 39
End: 2024-11-11
Payer: OTHER GOVERNMENT

## 2024-11-11 ENCOUNTER — HOSPITAL ENCOUNTER (OUTPATIENT)
Dept: CARDIOLOGY | Facility: HOSPITAL | Age: 39
Discharge: HOME | End: 2024-11-11
Payer: OTHER GOVERNMENT

## 2024-11-11 DIAGNOSIS — R00.2 PALPITATIONS: Primary | ICD-10-CM

## 2024-11-11 DIAGNOSIS — R00.2 PALPITATIONS: ICD-10-CM

## 2024-11-11 LAB
OHS QRS DURATION: 102 MS
OHS QTC CALCULATION: 467 MS

## 2024-11-11 PROCEDURE — 93010 ELECTROCARDIOGRAM REPORT: CPT | Mod: ,,, | Performed by: INTERNAL MEDICINE

## 2024-11-11 PROCEDURE — 93005 ELECTROCARDIOGRAM TRACING: CPT

## 2024-11-21 ENCOUNTER — PATIENT MESSAGE (OUTPATIENT)
Dept: FAMILY MEDICINE | Facility: CLINIC | Age: 39
End: 2024-11-21
Payer: OTHER GOVERNMENT

## 2024-11-22 ENCOUNTER — OFFICE VISIT (OUTPATIENT)
Dept: FAMILY MEDICINE | Facility: CLINIC | Age: 39
End: 2024-11-22
Payer: OTHER GOVERNMENT

## 2024-11-22 VITALS
BODY MASS INDEX: 20.67 KG/M2 | DIASTOLIC BLOOD PRESSURE: 61 MMHG | HEART RATE: 102 BPM | OXYGEN SATURATION: 95 % | HEIGHT: 70 IN | WEIGHT: 144.38 LBS | TEMPERATURE: 99 F | RESPIRATION RATE: 18 BRPM | SYSTOLIC BLOOD PRESSURE: 91 MMHG

## 2024-11-22 DIAGNOSIS — R19.7 DIARRHEA, UNSPECIFIED TYPE: Primary | ICD-10-CM

## 2024-11-22 DIAGNOSIS — R10.30 LOWER ABDOMINAL PAIN: ICD-10-CM

## 2024-11-22 DIAGNOSIS — R50.9 FEVER, UNSPECIFIED FEVER CAUSE: ICD-10-CM

## 2024-11-22 DIAGNOSIS — I49.3 PVC'S (PREMATURE VENTRICULAR CONTRACTIONS): ICD-10-CM

## 2024-11-22 LAB
ANION GAP SERPL CALCULATED.3IONS-SCNC: 11 MMOL/L (ref 7–16)
BASOPHILS # BLD AUTO: 0.05 K/UL (ref 0–0.2)
BASOPHILS NFR BLD AUTO: 0.5 % (ref 0–1)
BUN SERPL-MCNC: 18 MG/DL (ref 7–19)
BUN/CREAT SERPL: 20 (ref 6–20)
CALCIUM SERPL-MCNC: 8.4 MG/DL (ref 8.4–10.2)
CHLORIDE SERPL-SCNC: 104 MMOL/L (ref 98–107)
CO2 SERPL-SCNC: 21 MMOL/L (ref 22–29)
CREAT SERPL-MCNC: 0.88 MG/DL (ref 0.55–1.02)
DIFFERENTIAL METHOD BLD: ABNORMAL
EGFR (NO RACE VARIABLE) (RUSH/TITUS): 85 ML/MIN/1.73M2
EOSINOPHIL # BLD AUTO: 0.07 K/UL (ref 0–0.5)
EOSINOPHIL NFR BLD AUTO: 0.7 % (ref 1–4)
ERYTHROCYTE [DISTWIDTH] IN BLOOD BY AUTOMATED COUNT: 12.5 % (ref 11.5–14.5)
FATTY ACIDS: NORMAL /HPF
FECAL LEUKOCYTES: POSITIVE
GIARDIA ANTIGEN: NEGATIVE
GLUCOSE SERPL-MCNC: 102 MG/DL (ref 74–100)
H. PYLORI STOOL: POSITIVE
HCT VFR BLD AUTO: 41.8 % (ref 38–47)
HGB BLD-MCNC: 14.3 G/DL (ref 12–16)
IMM GRANULOCYTES # BLD AUTO: 0.04 K/UL (ref 0–0.04)
IMM GRANULOCYTES NFR BLD: 0.4 % (ref 0–0.4)
LYMPHOCYTES # BLD AUTO: 0.64 K/UL (ref 1–4.8)
LYMPHOCYTES NFR BLD AUTO: 6.6 % (ref 27–41)
MAGNESIUM SERPL-MCNC: 1.7 MG/DL (ref 1.6–2.6)
MCH RBC QN AUTO: 29.7 PG (ref 27–31)
MCHC RBC AUTO-ENTMCNC: 34.2 G/DL (ref 32–36)
MCV RBC AUTO: 86.7 FL (ref 80–96)
MONOCYTES # BLD AUTO: 0.38 K/UL (ref 0–0.8)
MONOCYTES NFR BLD AUTO: 3.9 % (ref 2–6)
MPC BLD CALC-MCNC: 10.1 FL (ref 9.4–12.4)
NEUTRAL FATS: NORMAL /HPF
NEUTROPHILS # BLD AUTO: 8.48 K/UL (ref 1.8–7.7)
NEUTROPHILS NFR BLD AUTO: 87.9 % (ref 53–65)
NRBC # BLD AUTO: 0 X10E3/UL
NRBC, AUTO (.00): 0 %
OCCULT BLOOD: POSITIVE
PLATELET # BLD AUTO: 275 K/UL (ref 150–400)
POTASSIUM SERPL-SCNC: 3.7 MMOL/L (ref 3.5–5.1)
RBC # BLD AUTO: 4.82 M/UL (ref 4.2–5.4)
SODIUM SERPL-SCNC: 132 MMOL/L (ref 136–145)
TSH SERPL DL<=0.005 MIU/L-ACNC: 1.4 UIU/ML (ref 0.35–4.94)
WBC # BLD AUTO: 9.66 K/UL (ref 4.5–11)

## 2024-11-22 PROCEDURE — 82272 OCCULT BLD FECES 1-3 TESTS: CPT | Mod: QW,,, | Performed by: CLINICAL MEDICAL LABORATORY

## 2024-11-22 PROCEDURE — 85025 COMPLETE CBC W/AUTO DIFF WBC: CPT | Mod: ,,, | Performed by: CLINICAL MEDICAL LABORATORY

## 2024-11-22 PROCEDURE — 80048 BASIC METABOLIC PNL TOTAL CA: CPT | Mod: ,,, | Performed by: CLINICAL MEDICAL LABORATORY

## 2024-11-22 PROCEDURE — 87493 C DIFF AMPLIFIED PROBE: CPT | Mod: ,,, | Performed by: CLINICAL MEDICAL LABORATORY

## 2024-11-22 PROCEDURE — 87329 GIARDIA AG IA: CPT | Mod: ,,, | Performed by: CLINICAL MEDICAL LABORATORY

## 2024-11-22 PROCEDURE — 84443 ASSAY THYROID STIM HORMONE: CPT | Mod: ,,, | Performed by: CLINICAL MEDICAL LABORATORY

## 2024-11-22 PROCEDURE — 87045 FECES CULTURE AEROBIC BACT: CPT | Mod: ,,, | Performed by: CLINICAL MEDICAL LABORATORY

## 2024-11-22 PROCEDURE — 87046 STOOL CULTR AEROBIC BACT EA: CPT | Mod: ,,, | Performed by: CLINICAL MEDICAL LABORATORY

## 2024-11-22 PROCEDURE — 87338 HPYLORI STOOL AG IA: CPT | Mod: ,,, | Performed by: CLINICAL MEDICAL LABORATORY

## 2024-11-22 PROCEDURE — 83735 ASSAY OF MAGNESIUM: CPT | Mod: ,,, | Performed by: CLINICAL MEDICAL LABORATORY

## 2024-11-22 PROCEDURE — 87177 OVA AND PARASITES SMEARS: CPT | Mod: ,,, | Performed by: CLINICAL MEDICAL LABORATORY

## 2024-11-22 PROCEDURE — 83630 LACTOFERRIN FECAL (QUAL): CPT | Mod: ,,, | Performed by: CLINICAL MEDICAL LABORATORY

## 2024-11-22 PROCEDURE — 82705 FATS/LIPIDS FECES QUAL: CPT | Mod: ,,, | Performed by: CLINICAL MEDICAL LABORATORY

## 2024-11-22 NOTE — PROGRESS NOTES
[facility]  [address] [place] [address]  Phone: [phone]  Fax: [phone]       PATIENT NAME: Kimberly Knutson   : 1985    AGE: 38 y.o. DATE OF ENCOUNTER: 24    MRN: [id number]      PCP: Deirdre Long FNP    Subjective:     Reason for Visit / Chief Complaint:     274}  Chief Complaint   Patient presents with    Fatigue     Patient c/o fatigue for the past few weeks, was seen on  and tested for Covid, Strep and Flu and all were negative. She states she passed out last night around midnight for 2-4 minutes.    Nausea     Patient c/o  nausea and vomiting, states this started a few weeks ago and stopped but returned yesterday.    Diarrhea     Patient c/o diarrhea off and on for the past few weeks.    Fever     Patient c/o fever of as high as 101.4, states she has been running fever since yesterday around noon.    Palpitations     Patient c/o having palpitations, states she had an EKG on  that showed PVC's.     Yesterday developed nausea & diarrhea, took zofran x2 or would have vomited.  Fever all day yesterday and this am up to 101.4 and took tylenol 1000 mg this am.  No abd pain.    Syncopal episode around midnight, got up to have diarrhea when she stood from toilet to vomit, had to sit back down and was vomiting in trash, felt hot, wasn't bearing down, woke up in the floor a few minutes later.  Didn't vomit.  No incontinence.  Bumped head on wall or floor but landed on robe, didn't hit hard, no headache today.    Had same type s/s minus syncopal episode when she saw Deirdre 24 but felt much worse then.  Came in on 4th day of s/s.  Had blood in urine but no gross hematuria. Urine culture negative.  Took macrobid x8 days caused a lot side effects.    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      Review of Systems:     Review of Systems    Constitutional:  Positive for chills, fatigue and fever.   HENT: Negative.     Respiratory: Negative.     Cardiovascular:  Negative for chest pain and palpitations.   Gastrointestinal:  Positive for diarrhea and nausea. Negative for abdominal pain, blood in stool and vomiting.   Genitourinary:  Negative for difficulty urinating, dysuria, frequency, hematuria and urgency.   Neurological:  Positive for syncope. Negative for headaches.       Allergies and Meds: 274}     Review of patient's allergies indicates:   Allergen Reactions    Xylocaine with epinephrine [lidocaine-epinephrine] Swelling     Swelling of tongue        Current Outpatient Medications   Medication Sig Dispense Refill    biotin 10,000 mcg Cap 96142 daily      buPROPion (WELLBUTRIN XL) 300 MG 24 hr tablet Take 1 tablet (300 mg total) by mouth once daily. 90 tablet 3    cholecalciferol, vitamin D3, 125 mcg (5,000 unit) capsule Take by mouth once daily.      fluconazole (DIFLUCAN) 150 MG Tab Take 1 tablet by mouth now, then repeat on day 3 1 tablet 0    ketoconazole (NIZORAL) 2 % shampoo Apply 1 Dose topically once a week.      meloxicam (MOBIC) 15 MG tablet TAKE 1 TABLET DAILY 90 tablet 3    naratriptan (AMERGE) 2.5 MG tablet Take 2.5 mg by mouth as needed for Migraine.      ondansetron (ZOFRAN-ODT) 4 MG TbDL Take 2 tablets (8 mg total) by mouth every 8 (eight) hours as needed (nasuea). 30 tablet 0    promethazine (PHENERGAN) 25 MG tablet TAKE 1/2 TO 1 TABLET BY MOUTH EVERY 6 HOURS AS NEEDED FOR NAUSEA OR HEADACHE      psyllium husk, with sugar, 3.4 gram/7 gram Powd Take 1 packet by mouth. metamucil      topiramate (TOPAMAX) 50 MG tablet Take 1 tablet (50 mg total) by mouth 2 (two) times daily. 180 tablet 3    valACYclovir (VALTREX) 1000 MG tablet Take 1,000 mg by mouth as needed (.).      vitamin E, dl,tocopheryl acet, (VITAMIN E, DL, ACETATE,) 180 mg (400 unit) Cap Take 400 Units by mouth once daily.       No current facility-administered medications  for this visit.       Labs:274}   I have reviewed labs below:    Lab Results   Component Value Date    WBC 7.48 2024    RBC 5.05 2024    HGB 15.1 2024    HCT 43.0 2024     2024     2024    K 4.2 2024     (H) 2024    CALCIUM 9.1 2024    GLU 97 2024    BUN 18 2024    CREATININE 0.93 2024    ALT 22 2024    AST 14 (L) 2024    CHOL 198 2024    TRIG 154 (H) 2024    HDL 62 (H) 2024    LDLCALC 105 2024       Point Of Care Testing (if applicable):  Lab Results   Component Value Date    [id number] Negative 2024    FLUAMOLEC Negative 2024    FLUBMOLEC Negative 2024    MOLSTREPAPOC Negative 2024     Any diagnostic testing results obtained in office or prior to appointment were reviewed were reviewed with patient.    Medical History: 274}     Past Medical History:   Diagnosis Date    Generalized anxiety disorder       Social History     Tobacco Use   Smoking Status Never    Passive exposure: Never   Smokeless Tobacco Never      Past Surgical History:   Procedure Laterality Date    AUGMENTATION OF BREAST      barium enema      CERVICAL BIOPSY  W/ LOOP ELECTRODE EXCISION       SECTION      2 c sections    COLONOSCOPY      COLPOSCOPY      GALLBLADDER SURGERY      hemorroidectomy      mommy makeover      [place] lipo, breast augmentation with lift.    pylonidal cyst      REPAIR OF EPIGASTRIC HERNIA      TONSILLECTOMY      wisdom teeth removal      All wisdom teeth        Health Maintenance:      Health Maintenance Topics with due status: Not Due       Topic Last Completion Date    Cervical Cancer Screening 2023    Mammogram 2024    RSV Vaccine (Age 60+ and Pregnant patients) Not Due       Objective:  274}   Vital Signs  Temp: 98.5 °F (36.9 °C)  Temp Source: Oral  Pulse: 102  Resp: 18  SpO2: 95 %  BP: 91/61  BP Location: Left arm  Patient Position:  "Sitting  Pain Score: 0-No pain  Height and Weight  Height: 5' 10" (177.8 cm)  Weight: 65.5 kg (144 lb 6.4 oz)  BSA (Calculated - sq m): 1.8 sq meters  BMI (Calculated): 20.7  Weight in (lb) to have BMI = 25: 173.9    Over the last two weeks how often have you been bothered by little interest or pleasure in doing things: 0  Over the last two weeks how often have you been bothered by feeling down, depressed or hopeless: 0  PHQ-2 Total Score: 0  PHQ-9 Score: 4  PHQ-9 Interpretation: Minimal or None    Wt Readings from Last 3 Encounters:   11/22/24 65.5 kg (144 lb 6.4 oz)   11/04/24 64.9 kg (143 lb)   07/01/24 68.9 kg (152 lb)     Physical Exam  Vitals and nursing note reviewed.   Constitutional:       General: She is not in acute distress.     Appearance: Normal appearance. She is ill-appearing. She is not diaphoretic.   HENT:      Head: Normocephalic.      Right Ear: Tympanic membrane, ear canal and external ear normal.      Left Ear: Tympanic membrane, ear canal and external ear normal.      Nose: Nose normal.      Mouth/Throat:      Mouth: Mucous membranes are moist.      Pharynx: Oropharynx is clear.   Eyes:      Conjunctiva/sclera: Conjunctivae normal.   Neck:      Trachea: Trachea normal.   Cardiovascular:      Rate and Rhythm: Normal rate and regular rhythm.      Pulses: Normal pulses.      Heart sounds: Normal heart sounds.   Pulmonary:      Effort: Pulmonary effort is normal. No respiratory distress.      Breath sounds: Normal breath sounds. No wheezing, rhonchi or rales.   Abdominal:      General: Abdomen is flat. Bowel sounds are increased. There is no distension.      Palpations: Abdomen is soft.      Tenderness: There is abdominal tenderness. There is no guarding.   Musculoskeletal:      Cervical back: Neck supple.      Right lower leg: No edema.      Left lower leg: No edema.   Lymphadenopathy:      Cervical: No cervical adenopathy.   Skin:     General: Skin is warm and dry.      Coloration: Skin is not " jaundiced or pale.   Neurological:      General: No focal deficit present.      Mental Status: She is alert and oriented to person, place, and time.      Gait: Gait normal.   Psychiatric:         Mood and Affect: Mood normal.         Behavior: Behavior normal.          Assessment and Plan: 274}     1. Diarrhea, unspecified type  Assessment & Plan:  Labs and stool studies as discussed and ordered since having a recurrence of s/s in < 1 mth.  KUB - results pending  Increase hydration, BP low, change positions slowly.    Orders:  -     Basic Metabolic Panel; Future; Expected date: 11/22/2024  -     CBC Auto Differential; Future; Expected date: 11/22/2024  -     TSH; Future; Expected date: 11/22/2024  -     X-Ray Abdomen AP 1 View; Future; Expected date: 11/22/2024  -     Stool culture; Future; Expected date: 11/22/2024  -     C Diff Toxin by PCR; Future; Expected date: 11/22/2024  -     Occult blood x 1, stool; Future; Expected date: 11/22/2024  -     Fecal leukocytes; Future; Expected date: 11/22/2024  -     H. pylori antigen, stool; Future; Expected date: 11/22/2024  -     Giardia antigen; Future; Expected date: 11/22/2024  -     Ova and Parasite Exam; Future; Expected date: 11/22/2024  -     Pancreatic elastase, fecal; Future; Expected date: 11/22/2024  -     Fecal fat, qualitative; Future; Expected date: 11/22/2024  -     Calprotectin, Stool; Future; Expected date: 11/22/2024    2. Lower abdominal pain  -     Basic Metabolic Panel; Future; Expected date: 11/22/2024  -     CBC Auto Differential; Future; Expected date: 11/22/2024  -     TSH; Future; Expected date: 11/22/2024  -     X-Ray Abdomen AP 1 View; Future; Expected date: 11/22/2024  -     Stool culture; Future; Expected date: 11/22/2024  -     C Diff Toxin by PCR; Future; Expected date: 11/22/2024  -     Occult blood x 1, stool; Future; Expected date: 11/22/2024  -     Fecal leukocytes; Future; Expected date: 11/22/2024  -     H. pylori antigen, stool;  Future; Expected date: 11/22/2024  -     Giardia antigen; Future; Expected date: 11/22/2024  -     Ova and Parasite Exam; Future; Expected date: 11/22/2024  -     Pancreatic elastase, fecal; Future; Expected date: 11/22/2024  -     Fecal fat, qualitative; Future; Expected date: 11/22/2024  -     Calprotectin, Stool; Future; Expected date: 11/22/2024    3. Fever, unspecified fever cause  -     Basic Metabolic Panel; Future; Expected date: 11/22/2024  -     CBC Auto Differential; Future; Expected date: 11/22/2024  -     Stool culture; Future; Expected date: 11/22/2024  -     C Diff Toxin by PCR; Future; Expected date: 11/22/2024  -     Fecal leukocytes; Future; Expected date: 11/22/2024    4. PVC's (premature ventricular contractions)  -     Basic Metabolic Panel; Future; Expected date: 11/22/2024  -     CBC Auto Differential; Future; Expected date: 11/22/2024  -     Magnesium; Future; Expected date: 11/22/2024  -     TSH; Future; Expected date: 11/22/2024      Diagnosis, risks, benefits, and side effects of any meds and treatment plan were discussed with the patient.  Patient to call or follow-up with any new or worsening symptoms or problems prior to next appointment.  Go to ER for any urgent complications.  All questions were answered to the satisfaction of the patient, and pt verbalized understanding and agreement to treatment plan.      Follow up if symptoms worsen or fail to improve, for follow-up with PCP as scheduled.    Signature:  LIZBET Parker-BC    Future Appointments   Date Time Provider Department Center   3/13/2025  1:00 PM [facility] [place]   6/24/2025  8:30 AM Deirdre Long FNP [facility] [place]

## 2024-11-22 NOTE — ASSESSMENT & PLAN NOTE
Labs and stool studies as discussed and ordered since having a recurrence of s/s in < 1 mth.  KUB - results pending  Increase hydration, BP low, change positions slowly.

## 2024-11-23 LAB
CAMPYLOBACTER ANTIGEN: NEGATIVE
EHEC (SHIGA TOXIN 1): NEGATIVE
EHEC (SHIGA TOXIN 2): NEGATIVE
SALM+SHIG+E COLI ETEC STL CULT: NORMAL

## 2024-11-24 ENCOUNTER — PATIENT MESSAGE (OUTPATIENT)
Dept: FAMILY MEDICINE | Facility: CLINIC | Age: 39
End: 2024-11-24
Payer: OTHER GOVERNMENT

## 2024-11-24 DIAGNOSIS — A04.72 C. DIFFICILE DIARRHEA: Primary | ICD-10-CM

## 2024-11-24 DIAGNOSIS — A04.8 H. PYLORI INFECTION: Primary | ICD-10-CM

## 2024-11-24 DIAGNOSIS — A04.8 H. PYLORI INFECTION: ICD-10-CM

## 2024-11-24 RX ORDER — VANCOMYCIN HYDROCHLORIDE 125 MG/1
125 CAPSULE ORAL EVERY 6 HOURS
Qty: 40 CAPSULE | Refills: 0 | Status: SHIPPED | OUTPATIENT
Start: 2024-11-24 | End: 2024-12-04

## 2024-11-24 RX ORDER — PANTOPRAZOLE SODIUM 40 MG/1
40 TABLET, DELAYED RELEASE ORAL DAILY
Qty: 14 TABLET | Refills: 0 | Status: SHIPPED | OUTPATIENT
Start: 2024-11-24 | End: 2024-12-08

## 2024-11-24 RX ORDER — AMOXICILLIN 500 MG/1
1000 CAPSULE ORAL EVERY 12 HOURS
Qty: 56 CAPSULE | Refills: 0 | Status: SHIPPED | OUTPATIENT
Start: 2024-11-24 | End: 2024-12-08

## 2024-11-24 RX ORDER — CLARITHROMYCIN 500 MG/1
500 TABLET, FILM COATED ORAL EVERY 12 HOURS
Qty: 28 TABLET | Refills: 0 | Status: SHIPPED | OUTPATIENT
Start: 2024-11-24 | End: 2024-12-08

## 2024-11-25 ENCOUNTER — PATIENT MESSAGE (OUTPATIENT)
Dept: FAMILY MEDICINE | Facility: CLINIC | Age: 39
End: 2024-11-25
Payer: OTHER GOVERNMENT

## 2024-12-01 ENCOUNTER — PATIENT MESSAGE (OUTPATIENT)
Dept: FAMILY MEDICINE | Facility: CLINIC | Age: 39
End: 2024-12-01
Payer: OTHER GOVERNMENT

## 2024-12-01 DIAGNOSIS — A04.8 H. PYLORI INFECTION: Primary | ICD-10-CM

## 2024-12-12 ENCOUNTER — PATIENT MESSAGE (OUTPATIENT)
Dept: FAMILY MEDICINE | Facility: CLINIC | Age: 39
End: 2024-12-12
Payer: OTHER GOVERNMENT

## 2024-12-18 ENCOUNTER — PATIENT MESSAGE (OUTPATIENT)
Dept: FAMILY MEDICINE | Facility: CLINIC | Age: 39
End: 2024-12-18
Payer: OTHER GOVERNMENT

## 2024-12-18 RX ORDER — FLUCONAZOLE 150 MG/1
150 TABLET ORAL
Qty: 3 TABLET | Refills: 0 | Status: SHIPPED | OUTPATIENT
Start: 2024-12-18 | End: 2024-12-27

## 2025-01-10 ENCOUNTER — PATIENT MESSAGE (OUTPATIENT)
Dept: FAMILY MEDICINE | Facility: CLINIC | Age: 40
End: 2025-01-10
Payer: OTHER GOVERNMENT

## 2025-01-14 ENCOUNTER — CLINICAL SUPPORT (OUTPATIENT)
Dept: FAMILY MEDICINE | Facility: CLINIC | Age: 40
End: 2025-01-14
Payer: OTHER GOVERNMENT

## 2025-01-14 ENCOUNTER — PATIENT MESSAGE (OUTPATIENT)
Dept: FAMILY MEDICINE | Facility: CLINIC | Age: 40
End: 2025-01-14
Payer: OTHER GOVERNMENT

## 2025-01-14 DIAGNOSIS — Z23 NEED FOR INFLUENZA VACCINATION: Primary | ICD-10-CM

## 2025-01-14 PROCEDURE — G0008 ADMIN INFLUENZA VIRUS VAC: HCPCS | Mod: ,,, | Performed by: NURSE PRACTITIONER

## 2025-01-14 PROCEDURE — 90656 IIV3 VACC NO PRSV 0.5 ML IM: CPT | Mod: ,,, | Performed by: NURSE PRACTITIONER

## 2025-02-18 DIAGNOSIS — G43.709 CHRONIC MIGRAINE WITHOUT AURA WITHOUT STATUS MIGRAINOSUS, NOT INTRACTABLE: ICD-10-CM

## 2025-02-18 DIAGNOSIS — F41.9 ANXIETY: ICD-10-CM

## 2025-02-19 RX ORDER — TOPIRAMATE 50 MG/1
50 TABLET, FILM COATED ORAL 2 TIMES DAILY
Qty: 180 TABLET | Refills: 3 | Status: SHIPPED | OUTPATIENT
Start: 2025-02-19

## 2025-02-19 RX ORDER — BUPROPION HYDROCHLORIDE 300 MG/1
300 TABLET ORAL
Qty: 90 TABLET | Refills: 3 | Status: SHIPPED | OUTPATIENT
Start: 2025-02-19

## 2025-03-13 ENCOUNTER — HOSPITAL ENCOUNTER (OUTPATIENT)
Dept: RADIOLOGY | Facility: HOSPITAL | Age: 40
Discharge: HOME | End: 2025-03-13
Attending: NURSE PRACTITIONER
Payer: OTHER GOVERNMENT

## 2025-03-13 VITALS — WEIGHT: 148 LBS | BODY MASS INDEX: 21.19 KG/M2 | HEIGHT: 70 IN

## 2025-03-13 DIAGNOSIS — Z12.31 OTHER SCREENING MAMMOGRAM: ICD-10-CM

## 2025-03-13 PROCEDURE — 77067 SCR MAMMO BI INCL CAD: CPT | Mod: TC

## 2025-03-17 ENCOUNTER — PATIENT MESSAGE (OUTPATIENT)
Dept: FAMILY MEDICINE | Facility: CLINIC | Age: 40
End: 2025-03-17
Payer: OTHER GOVERNMENT

## 2025-03-26 ENCOUNTER — HOSPITAL ENCOUNTER (OUTPATIENT)
Dept: RADIOLOGY | Facility: HOSPITAL | Age: 40
Discharge: HOME | End: 2025-03-26
Attending: RADIOLOGY
Payer: OTHER GOVERNMENT

## 2025-03-26 DIAGNOSIS — R92.8 ABNORMAL FINDING ON BREAST IMAGING: ICD-10-CM

## 2025-03-26 DIAGNOSIS — R92.8 ABNORMAL MAMMOGRAM: ICD-10-CM

## 2025-03-26 PROCEDURE — 76641 ULTRASOUND BREAST COMPLETE: CPT | Mod: TC,RT

## 2025-03-26 PROCEDURE — 77065 DX MAMMO INCL CAD UNI: CPT | Mod: 26,RT,, | Performed by: RADIOLOGY

## 2025-03-26 PROCEDURE — 77061 BREAST TOMOSYNTHESIS UNI: CPT | Mod: TC,RT

## 2025-03-26 PROCEDURE — 77061 BREAST TOMOSYNTHESIS UNI: CPT | Mod: 26,RT,, | Performed by: RADIOLOGY

## 2025-03-26 PROCEDURE — 76641 ULTRASOUND BREAST COMPLETE: CPT | Mod: 26,RT,, | Performed by: RADIOLOGY

## 2025-06-16 DIAGNOSIS — M13.0 POLYARTHRITIS: ICD-10-CM

## 2025-06-16 DIAGNOSIS — M25.559 HIP PAIN, UNSPECIFIED LATERALITY: ICD-10-CM

## 2025-07-22 ENCOUNTER — PATIENT MESSAGE (OUTPATIENT)
Dept: FAMILY MEDICINE | Facility: CLINIC | Age: 40
End: 2025-07-22
Payer: OTHER GOVERNMENT

## 2025-07-22 DIAGNOSIS — M13.0 POLYARTHRITIS: Chronic | ICD-10-CM

## 2025-07-22 RX ORDER — MELOXICAM 15 MG/1
15 TABLET ORAL
Qty: 90 TABLET | Refills: 3 | OUTPATIENT
Start: 2025-07-22

## 2025-07-22 RX ORDER — MELOXICAM 15 MG/1
15 TABLET ORAL DAILY PRN
Qty: 90 TABLET | Refills: 1 | Status: SHIPPED | OUTPATIENT
Start: 2025-07-22